# Patient Record
Sex: MALE | Race: WHITE | NOT HISPANIC OR LATINO | Employment: FULL TIME | URBAN - METROPOLITAN AREA
[De-identification: names, ages, dates, MRNs, and addresses within clinical notes are randomized per-mention and may not be internally consistent; named-entity substitution may affect disease eponyms.]

---

## 2017-05-04 ENCOUNTER — TRANSFERRED RECORDS (OUTPATIENT)
Dept: HEALTH INFORMATION MANAGEMENT | Facility: CLINIC | Age: 47
End: 2017-05-04

## 2018-01-16 ENCOUNTER — TRANSFERRED RECORDS (OUTPATIENT)
Dept: HEALTH INFORMATION MANAGEMENT | Facility: CLINIC | Age: 48
End: 2018-01-16

## 2018-09-10 ENCOUNTER — TRANSFERRED RECORDS (OUTPATIENT)
Dept: MULTI SPECIALTY CLINIC | Facility: CLINIC | Age: 48
End: 2018-09-10

## 2019-04-02 ENCOUNTER — TRANSFERRED RECORDS (OUTPATIENT)
Dept: HEALTH INFORMATION MANAGEMENT | Facility: CLINIC | Age: 49
End: 2019-04-02

## 2020-01-27 ENCOUNTER — TRANSFERRED RECORDS (OUTPATIENT)
Dept: HEALTH INFORMATION MANAGEMENT | Facility: CLINIC | Age: 50
End: 2020-01-27

## 2021-06-09 ENCOUNTER — TRANSFERRED RECORDS (OUTPATIENT)
Dept: HEALTH INFORMATION MANAGEMENT | Facility: CLINIC | Age: 51
End: 2021-06-09

## 2021-08-19 ENCOUNTER — TRANSFERRED RECORDS (OUTPATIENT)
Dept: HEALTH INFORMATION MANAGEMENT | Facility: CLINIC | Age: 51
End: 2021-08-19

## 2022-04-15 ENCOUNTER — TRANSFERRED RECORDS (OUTPATIENT)
Dept: HEALTH INFORMATION MANAGEMENT | Facility: CLINIC | Age: 52
End: 2022-04-15

## 2022-04-21 ENCOUNTER — TRANSFERRED RECORDS (OUTPATIENT)
Dept: HEALTH INFORMATION MANAGEMENT | Facility: CLINIC | Age: 52
End: 2022-04-21

## 2024-05-07 ENCOUNTER — TRANSFERRED RECORDS (OUTPATIENT)
Dept: HEALTH INFORMATION MANAGEMENT | Facility: CLINIC | Age: 54
End: 2024-05-07

## 2024-05-14 ENCOUNTER — TRANSFERRED RECORDS (OUTPATIENT)
Dept: HEALTH INFORMATION MANAGEMENT | Facility: CLINIC | Age: 54
End: 2024-05-14

## 2024-06-02 ENCOUNTER — HEALTH MAINTENANCE LETTER (OUTPATIENT)
Age: 54
End: 2024-06-02

## 2024-07-11 ENCOUNTER — OFFICE VISIT (OUTPATIENT)
Dept: FAMILY MEDICINE | Facility: CLINIC | Age: 54
End: 2024-07-11
Payer: COMMERCIAL

## 2024-07-11 VITALS
DIASTOLIC BLOOD PRESSURE: 84 MMHG | RESPIRATION RATE: 18 BRPM | TEMPERATURE: 97.2 F | HEART RATE: 82 BPM | SYSTOLIC BLOOD PRESSURE: 134 MMHG | HEIGHT: 74 IN | WEIGHT: 267.2 LBS | OXYGEN SATURATION: 98 % | BODY MASS INDEX: 34.29 KG/M2

## 2024-07-11 DIAGNOSIS — E78.5 HYPERLIPIDEMIA LDL GOAL <100: ICD-10-CM

## 2024-07-11 DIAGNOSIS — R79.89 LOW TESTOSTERONE IN MALE: ICD-10-CM

## 2024-07-11 DIAGNOSIS — E22.1 HYPERPROLACTINEMIA (H): ICD-10-CM

## 2024-07-11 DIAGNOSIS — I10 HYPERTENSION WITH GOAL BLOOD PRESSURE LESS THAN 120/80: ICD-10-CM

## 2024-07-11 DIAGNOSIS — Z12.5 SCREENING FOR PROSTATE CANCER: ICD-10-CM

## 2024-07-11 DIAGNOSIS — E11.9 TYPE 2 DIABETES MELLITUS WITHOUT COMPLICATION, WITHOUT LONG-TERM CURRENT USE OF INSULIN (H): Primary | ICD-10-CM

## 2024-07-11 PROCEDURE — G2211 COMPLEX E/M VISIT ADD ON: HCPCS | Performed by: INTERNAL MEDICINE

## 2024-07-11 PROCEDURE — 99204 OFFICE O/P NEW MOD 45 MIN: CPT | Performed by: INTERNAL MEDICINE

## 2024-07-11 RX ORDER — DAPAGLIFLOZIN AND METFORMIN HYDROCHLORIDE 10; 1000 MG/1; MG/1
TABLET, FILM COATED, EXTENDED RELEASE ORAL
COMMUNITY
Start: 2024-04-07 | End: 2024-07-30

## 2024-07-11 RX ORDER — PANTOPRAZOLE SODIUM 40 MG/1
FOR SUSPENSION ORAL
COMMUNITY
Start: 2013-07-01

## 2024-07-11 RX ORDER — HALOBETASOL PROPIONATE 0.5 MG/G
CREAM TOPICAL
COMMUNITY
Start: 2013-07-01

## 2024-07-11 RX ORDER — TESTOSTERONE UNDECANOATE 237 MG/1
CAPSULE, LIQUID FILLED ORAL
COMMUNITY
Start: 2023-06-01 | End: 2024-10-06

## 2024-07-11 RX ORDER — TADALAFIL 20 MG/1
TABLET ORAL
COMMUNITY
Start: 2013-07-01

## 2024-07-11 RX ORDER — TIRZEPATIDE 7.5 MG/.5ML
INJECTION, SOLUTION SUBCUTANEOUS
COMMUNITY
Start: 2024-04-07 | End: 2024-07-16

## 2024-07-11 RX ORDER — CHOLECALCIFEROL (VITAMIN D3) 50 MCG
TABLET ORAL
COMMUNITY
Start: 2015-07-01

## 2024-07-11 RX ORDER — PRAZOSIN HYDROCHLORIDE 1 MG/1
1 CAPSULE ORAL AT BEDTIME
Qty: 30 CAPSULE | Refills: 5 | Status: SHIPPED | OUTPATIENT
Start: 2024-07-11 | End: 2024-07-29

## 2024-07-11 RX ORDER — LOSARTAN POTASSIUM 50 MG/1
TABLET ORAL
COMMUNITY
Start: 2013-07-01

## 2024-07-11 RX ORDER — INSULIN DETEMIR 100 [IU]/ML
INJECTION, SOLUTION SUBCUTANEOUS
COMMUNITY
Start: 2023-12-14 | End: 2024-08-05

## 2024-07-11 RX ORDER — FENOFIBRATE 150 MG/1
CAPSULE ORAL
COMMUNITY
Start: 2013-07-01 | End: 2024-08-18

## 2024-07-11 RX ORDER — NIFEDIPINE 30 MG
TABLET, EXTENDED RELEASE ORAL
COMMUNITY
Start: 2018-07-01

## 2024-07-11 RX ORDER — ATORVASTATIN CALCIUM 40 MG/1
TABLET, FILM COATED ORAL
COMMUNITY
Start: 2013-07-01 | End: 2024-09-30

## 2024-07-11 ASSESSMENT — PAIN SCALES - GENERAL: PAINLEVEL: NO PAIN (0)

## 2024-07-11 NOTE — PROGRESS NOTES
[facility] INTERNAL MEDICINE NOTE    Sundeep Fernandez is a 54 year old male who presents to clinic today for the following health issues:    Diabetes Follow-up    How often are you checking your blood sugar? Two times daily  Blood sugar testing frequency justification:  Adjustment of medication(s)  What time of day are you checking your blood sugars (select all that apply)?  Before meals and At bedtime  Have you had any blood sugars above 200?  Yes   Have you had any blood sugars below 70?  No  What symptoms do you notice when your blood sugar is low?  Shaky and Lethargy  What concerns do you have today about your diabetes? None   Do you have any of these symptoms? (Select all that apply)  No numbness or tingling in feet.  No redness, sores or blisters on feet.  No complaints of excessive thirst.  No reports of blurry vision.  No significant changes to weight.    Updates:  -Difficulty sleeping.  -Takes Nifedipine for esophageal dysmotility.  -S/P Nissen fundoplication.        Patient Active Problem List   Diagnosis    Diabetes mellitus, type 2 (H)    Sleep apnea     Past Surgical History:   Procedure Laterality Date    ABDOMEN SURGERY  9/1/99    Reflux surgery    COLONOSCOPY  8/1/22    ENT SURGERY  9/1/2010    Septoplasty surgery    ORTHOPEDIC SURGERY  9/15/22    right rotator cuff surgery       Social History     Tobacco Use    Smoking status: Never     Passive exposure: Never    Smokeless tobacco: Never   Substance Use Topics    Alcohol use: Yes     Comment: maybe three beers a week     Family History   Problem Relation Age of Onset    Diabetes Father         Became diabetic after heart surgery    Hypertension Father     Breast Cancer Sister          Allergies   Allergen Reactions    Ace Inhibitors Hives        BP Readings from Last 3 Encounters:   07/11/24 134/84    Wt Readings from Last 3 Encounters:   07/11/24 121.2 kg (267 lb 3.2 oz)     "       ROS:  C: NEGATIVE for fever, chills, change in weight  I: NEGATIVE for worrisome rashes, moles or lesions  E: NEGATIVE for vision changes or irritation  E/M: NEGATIVE for ear, mouth and throat problems  R: NEGATIVE for significant cough or SOB  B: NEGATIVE for masses, tenderness or discharge  CV: NEGATIVE for chest pain, palpitations or peripheral edema  GI: NEGATIVE for nausea, abdominal pain, heartburn, or change in bowel habits  : NEGATIVE for frequency, dysuria, or hematuria  M: NEGATIVE for significant arthralgias or myalgia  N: NEGATIVE for weakness, dizziness or paresthesias  E: NEGATIVE for temperature intolerance, skin/hair changes  H: NEGATIVE for bleeding problems  P: NEGATIVE for changes in mood or affect    OBJECTIVE:                                                    /84 (BP Location: Left arm, Patient Position: Sitting, Cuff Size: Adult Large)   Pulse 82   Temp 97.2  F (36.2  C) (Temporal)   Resp 18   Ht 1.88 m (6' 2\")   Wt 121.2 kg (267 lb 3.2 oz)   SpO2 98%   BMI 34.31 kg/m    Body mass index is 34.31 kg/m .  GENERAL: alert and no distress  EYES: Eyes grossly normal to inspection and conjunctivae and sclerae normal  HENT: normal cephalic/atraumatic and oral mucous membranes moist  NECK: no adenopathy and thyroid normal to palpation  RESP: lungs clear to auscultation - no rales, rhonchi or wheezes  CV: regular rates and rhythm and no peripheral edema  ABDOMEN: soft, nontender and bowel sounds normal  MS: no gross musculoskeletal defects noted, no edema  NEURO: Normal strength and tone, mentation intact and speech normal  PSYCH: mentation appears normal, affect normal/bright    Diagnostic Test Results:  Recent Labs   Lab Test 07/18/24  0819   A1C 6.4*   LDL 65   HDL 18*   TRIG 218*   ALT 13   CR 1.12   GFRESTIMATED 77   POTASSIUM 4.7        ASSESSMENT/PLAN:                                                      Type 2 diabetes mellitus without complication, without long-term " current use of insulin (H)  - Hemoglobin A1c; Future  - Comprehensive metabolic panel; Future  - Albumin Random Urine Quantitative with Creat Ratio; Future  - MOUNJARO 7.5 MG/0.5ML pen; Inject 7.5 mg subcutaneously once a week    Low testosterone in male  - Adult Urology  Referral; Future    Hypertension with goal blood pressure less than 120/80  - prazosin (MINIPRESS) 1 MG capsule; Take 1 capsule (1 mg) by mouth at bedtime    Hyperlipidemia LDL goal <100  - Lipid panel reflex to direct LDL Fasting; Future    Hyperprolactinemia (H24)  - Prolactin; Future    Screening for prostate cancer  - PSA, screen; Future     Disposition:  Follow-up in 12 weeks or as needed.    Mahesh Mayo MD  [facility]

## 2024-07-16 ENCOUNTER — MYC REFILL (OUTPATIENT)
Dept: FAMILY MEDICINE | Facility: CLINIC | Age: 54
End: 2024-07-16
Payer: COMMERCIAL

## 2024-07-16 RX ORDER — TIRZEPATIDE 7.5 MG/.5ML
INJECTION, SOLUTION SUBCUTANEOUS
Status: CANCELLED | OUTPATIENT
Start: 2024-07-16

## 2024-07-16 RX ORDER — TIRZEPATIDE 7.5 MG/.5ML
7.5 INJECTION, SOLUTION SUBCUTANEOUS WEEKLY
Qty: 2 ML | Refills: 11 | Status: SHIPPED | OUTPATIENT
Start: 2024-07-16

## 2024-07-18 ENCOUNTER — LAB (OUTPATIENT)
Dept: LAB | Facility: CLINIC | Age: 54
End: 2024-07-18
Payer: COMMERCIAL

## 2024-07-18 DIAGNOSIS — E78.5 HYPERLIPIDEMIA LDL GOAL <100: ICD-10-CM

## 2024-07-18 DIAGNOSIS — Z12.5 SCREENING FOR PROSTATE CANCER: ICD-10-CM

## 2024-07-18 DIAGNOSIS — E11.9 TYPE 2 DIABETES MELLITUS WITHOUT COMPLICATION, WITHOUT LONG-TERM CURRENT USE OF INSULIN (H): ICD-10-CM

## 2024-07-18 DIAGNOSIS — E22.1 HYPERPROLACTINEMIA (H): ICD-10-CM

## 2024-07-18 LAB
ALBUMIN SERPL BCG-MCNC: 4.3 G/DL (ref 3.5–5.2)
ALP SERPL-CCNC: 66 U/L (ref 40–150)
ALT SERPL W P-5'-P-CCNC: 13 U/L (ref 0–70)
ANION GAP SERPL CALCULATED.3IONS-SCNC: 9 MMOL/L (ref 7–15)
AST SERPL W P-5'-P-CCNC: 33 U/L (ref 0–45)
BILIRUB SERPL-MCNC: 0.4 MG/DL
BUN SERPL-MCNC: 16.6 MG/DL (ref 6–20)
CALCIUM SERPL-MCNC: 9.3 MG/DL (ref 8.8–10.4)
CHLORIDE SERPL-SCNC: 104 MMOL/L (ref 98–107)
CHOLEST SERPL-MCNC: 127 MG/DL
CREAT SERPL-MCNC: 1.12 MG/DL (ref 0.67–1.17)
CREAT UR-MCNC: 127 MG/DL
EGFRCR SERPLBLD CKD-EPI 2021: 77 ML/MIN/1.73M2
FASTING STATUS PATIENT QL REPORTED: YES
FASTING STATUS PATIENT QL REPORTED: YES
GLUCOSE SERPL-MCNC: 121 MG/DL (ref 70–99)
HBA1C MFR BLD: 6.4 % (ref 0–5.6)
HCO3 SERPL-SCNC: 25 MMOL/L (ref 22–29)
HDLC SERPL-MCNC: 18 MG/DL
LDLC SERPL CALC-MCNC: 65 MG/DL
MICROALBUMIN UR-MCNC: 13 MG/L
MICROALBUMIN/CREAT UR: 10.24 MG/G CR (ref 0–17)
NONHDLC SERPL-MCNC: 109 MG/DL
POTASSIUM SERPL-SCNC: 4.7 MMOL/L (ref 3.4–5.3)
PROLACTIN SERPL 3RD IS-MCNC: 8 NG/ML (ref 4–15)
PROT SERPL-MCNC: 6.9 G/DL (ref 6.4–8.3)
PSA SERPL DL<=0.01 NG/ML-MCNC: 0.93 NG/ML (ref 0–3.5)
SODIUM SERPL-SCNC: 138 MMOL/L (ref 135–145)
TRIGL SERPL-MCNC: 218 MG/DL

## 2024-07-18 PROCEDURE — 82043 UR ALBUMIN QUANTITATIVE: CPT

## 2024-07-18 PROCEDURE — 80061 LIPID PANEL: CPT

## 2024-07-18 PROCEDURE — 80053 COMPREHEN METABOLIC PANEL: CPT

## 2024-07-18 PROCEDURE — G0103 PSA SCREENING: HCPCS

## 2024-07-18 PROCEDURE — 83036 HEMOGLOBIN GLYCOSYLATED A1C: CPT

## 2024-07-18 PROCEDURE — 36415 COLL VENOUS BLD VENIPUNCTURE: CPT

## 2024-07-18 PROCEDURE — 82570 ASSAY OF URINE CREATININE: CPT

## 2024-07-18 PROCEDURE — 84146 ASSAY OF PROLACTIN: CPT

## 2024-07-29 ENCOUNTER — MYC MEDICAL ADVICE (OUTPATIENT)
Dept: FAMILY MEDICINE | Facility: CLINIC | Age: 54
End: 2024-07-29
Payer: COMMERCIAL

## 2024-07-29 DIAGNOSIS — G47.00 INSOMNIA, UNSPECIFIED TYPE: Primary | ICD-10-CM

## 2024-07-29 RX ORDER — TRAZODONE HYDROCHLORIDE 50 MG/1
50 TABLET, FILM COATED ORAL EVERY EVENING
Qty: 30 TABLET | Refills: 5 | Status: SHIPPED | OUTPATIENT
Start: 2024-07-29 | End: 2024-08-20

## 2024-07-30 ENCOUNTER — MYC REFILL (OUTPATIENT)
Dept: FAMILY MEDICINE | Facility: CLINIC | Age: 54
End: 2024-07-30
Payer: COMMERCIAL

## 2024-07-30 DIAGNOSIS — E11.69 TYPE 2 DIABETES MELLITUS WITH OBESITY (H): Primary | ICD-10-CM

## 2024-07-30 DIAGNOSIS — E66.9 TYPE 2 DIABETES MELLITUS WITH OBESITY (H): Primary | ICD-10-CM

## 2024-07-30 RX ORDER — DAPAGLIFLOZIN AND METFORMIN HYDROCHLORIDE 10; 1000 MG/1; MG/1
1 TABLET, FILM COATED, EXTENDED RELEASE ORAL DAILY
Qty: 90 TABLET | Refills: 1 | Status: SHIPPED | OUTPATIENT
Start: 2024-07-30

## 2024-08-05 ENCOUNTER — MYC MEDICAL ADVICE (OUTPATIENT)
Dept: FAMILY MEDICINE | Facility: CLINIC | Age: 54
End: 2024-08-05
Payer: COMMERCIAL

## 2024-08-05 DIAGNOSIS — E11.9 TYPE 2 DIABETES MELLITUS TREATED WITH INSULIN (H): Primary | ICD-10-CM

## 2024-08-05 DIAGNOSIS — Z79.4 TYPE 2 DIABETES MELLITUS TREATED WITH INSULIN (H): Primary | ICD-10-CM

## 2024-08-18 ENCOUNTER — MYC REFILL (OUTPATIENT)
Dept: FAMILY MEDICINE | Facility: CLINIC | Age: 54
End: 2024-08-18
Payer: COMMERCIAL

## 2024-08-18 DIAGNOSIS — G47.00 INSOMNIA, UNSPECIFIED TYPE: ICD-10-CM

## 2024-08-18 DIAGNOSIS — F51.04 CHRONIC INSOMNIA: Primary | ICD-10-CM

## 2024-08-20 RX ORDER — TRAZODONE HYDROCHLORIDE 50 MG/1
50 TABLET, FILM COATED ORAL EVERY EVENING
Qty: 90 TABLET | Refills: 3 | Status: SHIPPED | OUTPATIENT
Start: 2024-08-20

## 2024-08-20 RX ORDER — TRAZODONE HYDROCHLORIDE 50 MG/1
50 TABLET, FILM COATED ORAL EVERY EVENING
Qty: 30 TABLET | Refills: 5 | OUTPATIENT
Start: 2024-08-20

## 2024-08-20 RX ORDER — FENOFIBRATE 150 MG/1
1 CAPSULE ORAL DAILY
Qty: 90 CAPSULE | Refills: 3 | Status: SHIPPED | OUTPATIENT
Start: 2024-08-20

## 2024-08-21 DIAGNOSIS — E78.1 HYPERTRIGLYCERIDEMIA: Primary | ICD-10-CM

## 2024-08-21 RX ORDER — FENOFIBRATE 160 MG/1
160 TABLET ORAL DAILY
Qty: 90 TABLET | Refills: 3 | Status: SHIPPED | OUTPATIENT
Start: 2024-08-21

## 2024-08-21 NOTE — TELEPHONE ENCOUNTER
160 mg prescription pended, routing to provider to review/advise      Alfreda Don, RN, BSN  [facility]  
Pharmacy is requesting change to 160 mg.    Fenofibrate 150 MG CAPS 90 capsule 3 8/20/2024      Is not available to order.  
Self Administrated

## 2024-09-16 ENCOUNTER — MYC MEDICAL ADVICE (OUTPATIENT)
Dept: FAMILY MEDICINE | Facility: CLINIC | Age: 54
End: 2024-09-16
Payer: COMMERCIAL

## 2024-09-16 DIAGNOSIS — Z79.4 TYPE 2 DIABETES MELLITUS TREATED WITH INSULIN (H): Primary | ICD-10-CM

## 2024-09-16 DIAGNOSIS — E11.9 TYPE 2 DIABETES MELLITUS TREATED WITH INSULIN (H): Primary | ICD-10-CM

## 2024-09-16 NOTE — TELEPHONE ENCOUNTER
Patient requesting new prescription for insulin pen needles.     Routing to provider to review/advise       Jessie Mirza RN  [facility]

## 2024-09-17 ENCOUNTER — LAB (OUTPATIENT)
Dept: LAB | Facility: CLINIC | Age: 54
End: 2024-09-17
Payer: COMMERCIAL

## 2024-09-17 DIAGNOSIS — N52.1 ERECTILE DISORDER DUE TO MEDICAL CONDITION IN MALE PATIENT: ICD-10-CM

## 2024-09-17 DIAGNOSIS — R97.20 ELEVATED PROSTATE SPECIFIC ANTIGEN (PSA): ICD-10-CM

## 2024-09-17 DIAGNOSIS — E29.1 3-OXO-5 ALPHA-STEROID DELTA 4-DEHYDROGENASE DEFICIENCY: ICD-10-CM

## 2024-09-17 LAB
HCT VFR BLD AUTO: 50.5 % (ref 40–53)
HGB BLD-MCNC: 17 G/DL (ref 13.3–17.7)
PSA FREE MFR SERPL: 26.32 %
PSA FREE SERPL-MCNC: 0.3 NG/ML
PSA SERPL DL<=0.01 NG/ML-MCNC: 1.14 NG/ML (ref 0–3.5)
SHBG SERPL-SCNC: 13 NMOL/L (ref 11–80)

## 2024-09-17 PROCEDURE — 84270 ASSAY OF SEX HORMONE GLOBUL: CPT

## 2024-09-17 PROCEDURE — 36415 COLL VENOUS BLD VENIPUNCTURE: CPT

## 2024-09-17 PROCEDURE — 85018 HEMOGLOBIN: CPT

## 2024-09-17 PROCEDURE — 85014 HEMATOCRIT: CPT

## 2024-09-17 PROCEDURE — 84153 ASSAY OF PSA TOTAL: CPT

## 2024-09-17 PROCEDURE — 84403 ASSAY OF TOTAL TESTOSTERONE: CPT

## 2024-09-17 PROCEDURE — 84154 ASSAY OF PSA FREE: CPT

## 2024-09-20 LAB
TESTOST FREE SERPL-MCNC: 3.78 NG/DL
TESTOST SERPL-MCNC: 132 NG/DL (ref 240–950)

## 2024-09-23 ENCOUNTER — MYC MEDICAL ADVICE (OUTPATIENT)
Dept: FAMILY MEDICINE | Facility: CLINIC | Age: 54
End: 2024-09-23
Payer: COMMERCIAL

## 2024-09-23 DIAGNOSIS — E29.1 MALE HYPOGONADISM: Primary | ICD-10-CM

## 2024-09-30 ENCOUNTER — MYC REFILL (OUTPATIENT)
Dept: FAMILY MEDICINE | Facility: CLINIC | Age: 54
End: 2024-09-30
Payer: COMMERCIAL

## 2024-09-30 DIAGNOSIS — E78.5 HYPERLIPIDEMIA LDL GOAL <70: Primary | ICD-10-CM

## 2024-10-01 RX ORDER — ATORVASTATIN CALCIUM 40 MG/1
40 TABLET, FILM COATED ORAL DAILY
Qty: 90 TABLET | Refills: 2 | Status: SHIPPED | OUTPATIENT
Start: 2024-10-01

## 2024-10-02 ENCOUNTER — IMMUNIZATION (OUTPATIENT)
Dept: FAMILY MEDICINE | Facility: CLINIC | Age: 54
End: 2024-10-02
Payer: COMMERCIAL

## 2024-10-02 PROCEDURE — 91320 SARSCV2 VAC 30MCG TRS-SUC IM: CPT

## 2024-10-02 PROCEDURE — 90480 ADMN SARSCOV2 VAC 1/ONLY CMP: CPT

## 2024-10-02 PROCEDURE — 90471 IMMUNIZATION ADMIN: CPT

## 2024-10-02 PROCEDURE — 90673 RIV3 VACCINE NO PRESERV IM: CPT

## 2024-10-06 ENCOUNTER — MYC REFILL (OUTPATIENT)
Dept: FAMILY MEDICINE | Facility: CLINIC | Age: 54
End: 2024-10-06
Payer: COMMERCIAL

## 2024-10-06 DIAGNOSIS — E29.1 MALE HYPOGONADISM: Primary | ICD-10-CM

## 2024-10-07 RX ORDER — TESTOSTERONE UNDECANOATE 237 MG/1
237 CAPSULE, LIQUID FILLED ORAL 2 TIMES DAILY
Qty: 180 CAPSULE | Refills: 1 | Status: SHIPPED | OUTPATIENT
Start: 2024-10-07

## 2024-10-07 RX ORDER — TESTOSTERONE UNDECANOATE 237 MG/1
237 CAPSULE, LIQUID FILLED ORAL 2 TIMES DAILY
Qty: 60 CAPSULE | Refills: 5 | Status: SHIPPED | OUTPATIENT
Start: 2024-10-07 | End: 2024-10-07

## 2024-10-07 NOTE — TELEPHONE ENCOUNTER
Clinic RN: Please contact patient because the medication is listed as historical or discontinued. Confirm patient is taking this medication. Document findings and route refill encounter to provider for approval or denial.      ALAN AlbaN, RN  [facility]

## 2024-10-07 NOTE — TELEPHONE ENCOUNTER
Called patient regarding refill request for Jatenzo. Per patient he is taking this as prescribed. In the past this was prescribed by his urologist in [place]. Now that he moved back to [place], he needs a refill until he sees the endocrinologist in November.     Routing to review/advise.     Jessie SPARKS,  RN  [facility]

## 2024-10-12 ENCOUNTER — TRANSFERRED RECORDS (OUTPATIENT)
Dept: MULTI SPECIALTY CLINIC | Facility: CLINIC | Age: 54
End: 2024-10-12
Payer: COMMERCIAL

## 2024-10-12 LAB — RETINOPATHY: NORMAL

## 2024-10-14 ENCOUNTER — MYC REFILL (OUTPATIENT)
Dept: FAMILY MEDICINE | Facility: CLINIC | Age: 54
End: 2024-10-14
Payer: COMMERCIAL

## 2024-10-14 DIAGNOSIS — E11.9 TYPE 2 DIABETES MELLITUS TREATED WITH INSULIN (H): ICD-10-CM

## 2024-10-14 DIAGNOSIS — Z79.4 TYPE 2 DIABETES MELLITUS TREATED WITH INSULIN (H): ICD-10-CM

## 2024-10-14 NOTE — TELEPHONE ENCOUNTER
Pharmacy requested refills that are already active on file. Refused request to pharmacy.  
Pt calling regarding refill request for insulin glargine (LANTUS PEN) 100 UNIT/ML pen. Per refill RN note, refills on file at pharmacy. RN advised pt to call pharmacy to request refill, pt verbalized understanding.     Sheila Sawyer RN  
no

## 2024-10-15 ENCOUNTER — LAB (OUTPATIENT)
Dept: LAB | Facility: CLINIC | Age: 54
End: 2024-10-15
Payer: COMMERCIAL

## 2024-10-15 DIAGNOSIS — E29.1 MALE HYPOGONADISM: ICD-10-CM

## 2024-10-15 LAB — SHBG SERPL-SCNC: 13 NMOL/L (ref 11–80)

## 2024-10-15 PROCEDURE — 84270 ASSAY OF SEX HORMONE GLOBUL: CPT

## 2024-10-15 PROCEDURE — 36415 COLL VENOUS BLD VENIPUNCTURE: CPT

## 2024-10-15 PROCEDURE — 84403 ASSAY OF TOTAL TESTOSTERONE: CPT

## 2024-10-17 LAB
TESTOST FREE SERPL-MCNC: 7.4 NG/DL
TESTOST SERPL-MCNC: 250 NG/DL (ref 240–950)

## 2024-10-20 ENCOUNTER — HEALTH MAINTENANCE LETTER (OUTPATIENT)
Age: 54
End: 2024-10-20

## 2024-10-21 ENCOUNTER — VIRTUAL VISIT (OUTPATIENT)
Dept: FAMILY MEDICINE | Facility: CLINIC | Age: 54
End: 2024-10-21
Payer: COMMERCIAL

## 2024-10-21 DIAGNOSIS — J32.9 SINUSITIS, UNSPECIFIED CHRONICITY, UNSPECIFIED LOCATION: Primary | ICD-10-CM

## 2024-10-21 PROCEDURE — 99213 OFFICE O/P EST LOW 20 MIN: CPT | Mod: 95 | Performed by: PHYSICIAN ASSISTANT

## 2024-10-21 RX ORDER — FLUTICASONE PROPIONATE 50 MCG
1 SPRAY, SUSPENSION (ML) NASAL DAILY
Qty: 16 G | Refills: 1 | Status: SHIPPED | OUTPATIENT
Start: 2024-10-21

## 2024-10-21 NOTE — PROGRESS NOTES
"Chente is a 54 year old who is being evaluated via a billable video visit.    How would you like to obtain your AVS? MyChart  If the video visit is dropped, the invitation should be resent by: Text to cell phone: [phone]  Will anyone else be joining your video visit? No      Assessment & Plan     Sinusitis, unspecified chronicity, unspecified location  Encouraged pt to take covid test and if negative try nasal spray for a few days and if still not improving start antibiotics .  If covid positive would continue symptomatic treatment for longer.  Follow up as needed  - fluticasone (FLONASE) 50 MCG/ACT nasal spray; Spray 1 spray into both nostrils daily.  - amoxicillin-clavulanate (AUGMENTIN) 875-125 MG tablet; Take 1 tablet by mouth 2 times daily.          BMI  Estimated body mass index is 34.31 kg/m  as calculated from the following:    Height as of 7/11/24: 1.88 m (6' 2\").    Weight as of 7/11/24: 121.2 kg (267 lb 3.2 oz).   Weight management plan: not addressed           Subjective   Chente is a 54 year old, presenting for the following health issues:  URI      10/21/2024    10:28 AM   Additional Questions   Roomed by Angie   Accompanied by self     History of Present Illness       Reason for visit:  Cold   He is taking medications regularly.       Acute Illness  Acute illness concerns:   Onset/Duration: 1 week -day 6   Symptoms:  Fever: No  Chills/Sweats: YES-sweats at night  Headache (location?): YES  Sinus Pressure: YES  Conjunctivitis:  No  Ear Pain: no  Rhinorrhea: No  Congestion: No  Sore Throat: No  Cough: no  Wheeze: No  Decreased Appetite: YES  Nausea: No  Vomiting: No  Diarrhea: YES more then a week ago -this is how it started over a week ago.  This has resolved.    Dysuria/Freq.: No  Dysuria or Hematuria: No  Fatigue/Achiness: YES  Sick/Strep Exposure: No  2 weekends ago did travel.    Didn't test for covid.    Therapies tried and outcome: Nicole-Trisha-did not help   Not improving.  More tired today.  " Didn't sleep well last night.  Has had 2 lows yesterday due to lack of appetite.  Fatigue is bad.    Hx of sinus surgery that wasn't successful.  Always feels congestion.  Hasn't tried anything for the congestion.                Objective           Vitals:  No vitals were obtained today due to virtual visit.    Physical Exam   GENERAL: alert and no distress  EYES: Eyes grossly normal to inspection.  No discharge or erythema, or obvious scleral/conjunctival abnormalities.  RESP: No audible wheeze, cough, or visible cyanosis.    SKIN: Visible skin clear. No significant rash, abnormal pigmentation or lesions.  NEURO: Cranial nerves grossly intact.  Mentation and speech appropriate for age.  PSYCH: Appropriate affect, tone, and pace of words          Video-Visit Details    Type of service:  Video Visit   Originating Location (pt. Location): Home    Distant Location (provider location):  On-site  Platform used for Video Visit: Joyce  Signed Electronically by: Rica Putnam PA-C

## 2024-10-21 NOTE — CONFIDENTIAL NOTE
RECORDS RECEIVED FROM: internal    DATE RECEIVED: 11.5.24    NOTES (FOR ALL VISITS) STATUS DETAILS   OFFICE NOTES from referring provider internal     OFFICE NOTES from other specialist Received  [id number], 5.7.24 Rocio      MEDICATION LIST internal     LABS     DIABETES: HBGA1C, CREATININE, FASTING LIPIDS, MICROALBUMIN URINE, POTASSIUM, TSH, T4    THYROID: TSH, T4, CBC, THYRODLONULIN, TOTAL T3, FREE T4, CALCITONIN, CEA internal  Testosterone free- 10.15.24   Prolactin- 7/18/24  CMP-  7/18/24  Lipid-  7/18/24  HBGA1C-  7/18/24  Received labs- 5/7/24

## 2024-10-23 ENCOUNTER — TELEPHONE (OUTPATIENT)
Dept: FAMILY MEDICINE | Facility: CLINIC | Age: 54
End: 2024-10-23
Payer: COMMERCIAL

## 2024-10-23 DIAGNOSIS — R79.89 LOW TESTOSTERONE IN MALE: Primary | ICD-10-CM

## 2024-10-23 NOTE — TELEPHONE ENCOUNTER
Order/Referral Request    Who is requesting: Pt.    Orders being requested: referral for endocrinology    Reason service is needed/diagnosis: referral for endocrinology    When are orders needed by: asap    Has this been discussed with Provider: Yes PCP aware    Does patient have a preference on a Group/Provider/Facility? In network provider (NOT Dr. Sawant)    Does patient have an appointment scheduled?: No    Where to send orders: N/A and N/A    Could we send this information to you in Mobly or would you prefer to receive a phone call?:   Patient would prefer a phone call or Phylogyt  Okay to leave a detailed message?: Yes at Cell number on file:    Telephone Information:   Mobile [phone]

## 2024-10-23 NOTE — TELEPHONE ENCOUNTER
This writer attempted to contact pt on 10/23/24      Reason for call to relay referral information  and left message.      If patient calls back:   Relay msg down below.         Althea Chavez RN

## 2024-10-23 NOTE — TELEPHONE ENCOUNTER
Pt had an appointment on 11/5 and is now cancelled.   States that Dr. Sawant is not in network.     The referral that was placed on 7/11/24 is for urology but they do not manage testosterone concerns.       Pt states that he needs a referral specifically for endocrinology.    Rae Cordero, ALANN, RN  [facility]

## 2024-10-23 NOTE — TELEPHONE ENCOUNTER
Referral Details    Referred By  Referred To   Mahesh Mayo MD [address] [place] [address]   Phone: [phone]   Fax: [phone]    Diagnoses: Low testosterone in male   Order: Adult Endocrinology  Referral       Comment: Please be aware that coverage of these services is subject to the terms and limitations of your health insurance plan.  Call member services at your health plan with any benefit or coverage questions.   [email or web address] will call you to coordinate your care as prescribed by the provider.  If you don t hear from a representative within 2 business days, please call [phone].     Patient Name  Sundeep Fernandez MRN  [id number] Legal Sex  Male   1970     Patient Demographics    Address  [address] [place] [address] [address] [place] [address] Phone  [phone] (Home)  [phone] (Mobile) E-mail Address  [email or web address]

## 2024-10-23 NOTE — TELEPHONE ENCOUNTER
Patient notified of provider message as written.  Patient verbalized understanding. Patient going to contact his insurance company to see what is in network and what isn't.      Kristina Kjellberg, MSN, RN

## 2024-10-28 ENCOUNTER — OFFICE VISIT (OUTPATIENT)
Dept: FAMILY MEDICINE | Facility: CLINIC | Age: 54
End: 2024-10-28
Attending: PHYSICIAN ASSISTANT
Payer: COMMERCIAL

## 2024-10-28 VITALS
OXYGEN SATURATION: 97 % | WEIGHT: 256.5 LBS | SYSTOLIC BLOOD PRESSURE: 100 MMHG | HEART RATE: 93 BPM | HEIGHT: 74 IN | BODY MASS INDEX: 32.92 KG/M2 | TEMPERATURE: 98.1 F | DIASTOLIC BLOOD PRESSURE: 80 MMHG | RESPIRATION RATE: 20 BRPM

## 2024-10-28 DIAGNOSIS — E66.9 TYPE 2 DIABETES MELLITUS WITH OBESITY (H): ICD-10-CM

## 2024-10-28 DIAGNOSIS — E11.69 TYPE 2 DIABETES MELLITUS WITH OBESITY (H): ICD-10-CM

## 2024-10-28 DIAGNOSIS — I10 HYPERTENSION WITH GOAL BLOOD PRESSURE LESS THAN 120/80: ICD-10-CM

## 2024-10-28 DIAGNOSIS — J01.90 ACUTE SINUSITIS WITH SYMPTOMS GREATER THAN 10 DAYS: Primary | ICD-10-CM

## 2024-10-28 PROBLEM — D35.2 PITUITARY ADENOMA (H): Status: ACTIVE | Noted: 2019-06-24

## 2024-10-28 PROBLEM — E78.5 DYSLIPIDEMIA: Status: ACTIVE | Noted: 2017-11-16

## 2024-10-28 PROCEDURE — 99214 OFFICE O/P EST MOD 30 MIN: CPT

## 2024-10-28 RX ORDER — DOXYCYCLINE 100 MG/1
100 CAPSULE ORAL 2 TIMES DAILY
Qty: 14 CAPSULE | Refills: 0 | Status: SHIPPED | OUTPATIENT
Start: 2024-10-28 | End: 2024-11-04

## 2024-10-28 ASSESSMENT — ENCOUNTER SYMPTOMS: FEVER: 1

## 2024-10-28 ASSESSMENT — PAIN SCALES - GENERAL: PAINLEVEL_OUTOF10: NO PAIN (0)

## 2024-10-28 NOTE — PROGRESS NOTES
Assessment & Plan     Acute sinusitis with symptoms greater than 10 days  Patient is a 54 year-old male with hypertension, type 2 diabetes, hyperlipidemia, and history of pituitary adenoma presenting with concerns of ongoing sinusitis symptoms for the past 2 weeks with associated fevers. Had been prescribed Augmentin at time of virtual visit on 10/25/24. Has been taking as prescribed but continues to be symptomatic with fevers. Plan to change Augmentin to 7 day course of BID doxycycline. If symptoms fail to improve despite alternative antibiotic, would recommend serologic testing (CBC with diff, ESR, CRP, BMP, etc) and respiratory PCR for further evaluation of symptoms. Discussed proper use of medication(s) and potential side effects. Follow-up if symptoms fail to improve despite above interventions. Patient understands and is agreeable to plan as discussed in clinic.  - doxycycline hyclate (VIBRAMYCIN) 100 MG capsule; Take 1 capsule (100 mg) by mouth 2 times daily for 7 days.    Type 2 diabetes mellitus with obesity (H)  Stable, managed by endocrinology.     Hypertension with goal blood pressure less than 120/80  Stable during encounter. Continue with prescribed medication regimen.       Subjective   Chente is a 54 year old, presenting for the following health issues:  Follow Up, Congestion, and Fever        10/28/2024     3:13 PM   Additional Questions   Roomed by AD   Accompanied by Spouse       Via the Health Maintenance questionnaire, the patient has reported the following services have been completed -Eye Exam: [facility] [place] [facility] 2024-10-12, this information has been sent to the abstraction team.  History of Present Illness       Reason for visit:  Cold   He is taking medications regularly.     Acute Illness  Acute illness concerns:   Onset/Duration: 2 Weeks ago he started to have diarrhea for 3 days and then Head congestion, stuffy nose and fatigue  Symptoms:  Fever: YES- Fevers started a week ago Today.  "Highest 103 and then will have a 101 fever, on and off.   Chills/Sweats: YES- Hot flash feelings  Headache (location?): No  Sinus Pressure: YES - Intermittent foggy  Conjunctivitis:  No  Ear Pain: no  Rhinorrhea: No  Congestion: No  Sore Throat: No  Cough: no  Wheeze: No  Decreased Appetite: YES  Nausea: No  Vomiting: No  Diarrhea: YES  Dysuria/Freq.: No  Dysuria or Hematuria: No  Fatigue/Achiness: YES  Sick/Strep Exposure: No  Therapies tried and outcome: Was prescribed nasal spray and amoxicillin last week. Neither have done anything. Rotating acetaminophen and tylenol and then ibuprofen at bed time. Also tried sudafed which did nothing    Presents with concerns of ongoing frontal sinus pressure and intermittent fevers for the past 2 weeks. Was seen on 10/25 and prescribed Augmentin for suspect sinus infection. Has been taking as prescribed along with topical nasal corticosteroids without symptoms improvement. Continues to have low grade to moderate fevers that are responding to acetaminophen and ibuprofen. Concerned due to ongoing fatigue and tiredness associated with symptoms. Also noticing a decrease appetite. Blood sugars remain stable.     Denies post-nasal drip, rhinorrhea, nasal discharge, otalgia, cough, sore throat, or other URI symptoms.       Objective    /80 (BP Location: Left arm, Patient Position: Sitting, Cuff Size: Adult Regular)   Pulse 93   Temp 98.1  F (36.7  C) (Temporal)   Resp 20   Ht 1.88 m (6' 2.02\")   Wt 116.3 kg (256 lb 8 oz)   SpO2 97%   BMI 32.92 kg/m    Body mass index is 32.92 kg/m .  Physical Exam  Vitals reviewed.   Constitutional:       General: He is not in acute distress.     Appearance: Normal appearance. He is not ill-appearing.   HENT:      Head: Normocephalic and atraumatic.      Right Ear: Tympanic membrane, ear canal and external ear normal.      Left Ear: Tympanic membrane, ear canal and external ear normal.      Ears:      Comments: Negative for middle ear " effusion, TM injection, bulging, and erythema bilaterally.     Nose: Congestion present. No rhinorrhea.      Right Sinus: No maxillary sinus tenderness or frontal sinus tenderness.      Left Sinus: No maxillary sinus tenderness or frontal sinus tenderness.      Mouth/Throat:      Mouth: Mucous membranes are moist.      Pharynx: Oropharynx is clear. No oropharyngeal exudate or posterior oropharyngeal erythema.   Eyes:      Conjunctiva/sclera: Conjunctivae normal.      Right eye: Right conjunctiva is not injected. No exudate.     Left eye: Left conjunctiva is not injected. No exudate.     Pupils: Pupils are equal, round, and reactive to light.   Cardiovascular:      Rate and Rhythm: Normal rate and regular rhythm.      Heart sounds: Normal heart sounds, S1 normal and S2 normal. No murmur heard.  Pulmonary:      Effort: Pulmonary effort is normal. No respiratory distress.      Breath sounds: Normal breath sounds. No wheezing.   Lymphadenopathy:      Cervical: No cervical adenopathy.   Skin:     General: Skin is warm and dry.      Capillary Refill: Capillary refill takes less than 2 seconds.      Findings: No lesion or rash.   Neurological:      Mental Status: He is alert.   Psychiatric:         Attention and Perception: Attention and perception normal.         Mood and Affect: Mood and affect normal.              Signed Electronically by: EFREN Granda CNP

## 2024-10-28 NOTE — PATIENT INSTRUCTIONS
Symptoms should start to improve over the next 24-48 hours once on the antibiotic. If symptoms continue despite change in medication please follow-up later this week as we should complete labs and respiratory testing for further evaluation of ongoing symptoms.     Symptom Treatment:  - Rest and Hydration: Make sure to get plenty of rest to help your body fight off the infection.  Drink lots of fluids like water, herbal tea, and clear broth to stay hydrated.    - Pain Relief: Over-the-counter pain relievers such as acetaminophen (Tylenol) or ibuprofen (Advil, Motrin) can help alleviate headache or facial pain associated with sinusitis.  You can take 1000 mg of tylenol up to three times daily, as long as another provider has not restricted you from taking this type of medication.  You can take 400-600 mg of Ibuprofen up to three times daily with food, as long as another provider has restricted you from taking this type of medication.    - Warm Compresses: Apply warm compresses to your face to help ease sinus pain and pressure.  Simply soak a clean towel in warm water, wring out the excess water, and place it over your face for a few minutes at a time.    - Nasal Decongestants: Over-the-counter nasal decongestant sprays or drops may help relieve nasal congestion.  Be sure to use them only as directed and for no longer than 3 days to avoid rebound congestion.            Infection Treatment:  - Nasal Saline Irrigation: Use a saline nasal rinse, or nasal spray to help clear out mucus and soothe your nasal passages.  Follow the instructions provided with the saline rinse or spray for proper use. Make sure to use distilled water from the store, not tap water.            - Nasal Corticosteroids: I recommend using a nasal corticosteroid spray, such as Flonase or Nasacort, to reduce inflammation in your nasal passages and improve breathing and drainage.  Use the nasal spray once daily (1-2 sprays into each nostril).   It is  "best to do a saline rinse just prior to using the nasal spray.  Shake and \"prime\" the bottle first making sure a nice spray comes out prior to use.  Aim back into the sinuses, not just straight up your nose. Also aim a little away from the center (septum) of your nose.   Breath in slightly (but not excessively) with each spray. When completed breath out through the mouth  Repeat on the other side.  Wipe of the nozzle with a clean tissue when complete and cover with the manufactures cap.    Store in a room temperature area out of the light.  Don't share sprays with friends and family.    A side effect of most nasal sprays includes nose bleeds.  Be patient as it may take a few days to start working.              - Prescription Medications: After 7-10 days, we typically consider your symptoms to be from a bacterial infection rather than a virus. In your case, we have decided to treat this infection with an antibiotic. Be sure to take the antibiotics exactly as prescribed, even if you start feeling better before you finish the entire course.  All antibiotics can cause diarrhea as a side effect.   In rare instances, use of antibiotics can disrupt your normal gut bacteria that help you digest food and a new bad infection can take over called C. Diff. This leads to explosive watery diarrhea, is not pleasant, and can be difficult to treat. This is one reason we try to avoid antibiotics when able.  Additionally, these bugs can become resistant to our antibiotics over time if used frequently, which is another reason to avoid their use if we can avoid it.    The particular antibiotic I have prescribed for you is called doxycycline. Please take with food to prevent upset stomach and take until course is complete. I would recommend eating yogurt/cottage cheese or taking a probiotic supplement while taking to prevent diarrhea.         When to Seek Medical Attention: It's important to follow up with a healthcare provider if your " symptoms worsen, or do not improve after a few days of treatment.  I may need to reassess your condition and adjust your treatment plan accordingly.  If you develop severe headache, facial swelling, or vision changes.  If you have a high fever lasting more than a few days.  If you experience difficulty breathing or chest pain.  If you have persistent symptoms despite treatment.    Remember, everyone's body responds differently to treatment, so it's important to be patient and give your body time to heal. If you have any questions or concerns, don't hesitate to reach out to me.

## 2024-11-05 ENCOUNTER — PRE VISIT (OUTPATIENT)
Dept: ENDOCRINOLOGY | Facility: CLINIC | Age: 54
End: 2024-11-05

## 2024-11-11 ENCOUNTER — TELEPHONE (OUTPATIENT)
Dept: FAMILY MEDICINE | Facility: OTHER | Age: 54
End: 2024-11-11
Payer: COMMERCIAL

## 2024-11-11 NOTE — TELEPHONE ENCOUNTER
Spoke with patient, scheduled for appointment.    Future Appointments 11/11/2024 - 5/10/2025        Date Visit Type Length Department Provider     11/18/2024 11:30 AM OFFICE VISIT 30 min [facility] Demetria Krishnan APRN CNP    Location Instructions:     [facility] is located at [address] [address], one mile north of the [place] [address] exit off of [address] [place] [address]. From [address] [facility], exit to turn west on [address] [place] [address], then turn south on [address].               4/14/2025 11:00 AM NEW ENDOCRINE 60 min [facility] Cely Hernandez PA-C Imani Soward-Robinson, MA

## 2024-11-18 ENCOUNTER — MYC REFILL (OUTPATIENT)
Dept: FAMILY MEDICINE | Facility: CLINIC | Age: 54
End: 2024-11-18

## 2024-11-18 ENCOUNTER — OFFICE VISIT (OUTPATIENT)
Dept: FAMILY MEDICINE | Facility: CLINIC | Age: 54
End: 2024-11-18
Payer: COMMERCIAL

## 2024-11-18 VITALS
RESPIRATION RATE: 15 BRPM | HEART RATE: 85 BPM | DIASTOLIC BLOOD PRESSURE: 70 MMHG | SYSTOLIC BLOOD PRESSURE: 118 MMHG | BODY MASS INDEX: 32.66 KG/M2 | WEIGHT: 254.5 LBS | OXYGEN SATURATION: 94 % | HEIGHT: 74 IN | TEMPERATURE: 98.1 F

## 2024-11-18 DIAGNOSIS — L30.1 ECZEMA, DYSHIDROTIC: ICD-10-CM

## 2024-11-18 DIAGNOSIS — J32.9 CHRONIC SINUSITIS, UNSPECIFIED LOCATION: Primary | ICD-10-CM

## 2024-11-18 DIAGNOSIS — I10 HTN, GOAL BELOW 140/90: Primary | ICD-10-CM

## 2024-11-18 DIAGNOSIS — E66.9 TYPE 2 DIABETES MELLITUS WITH OBESITY (H): ICD-10-CM

## 2024-11-18 DIAGNOSIS — E11.69 TYPE 2 DIABETES MELLITUS WITH OBESITY (H): ICD-10-CM

## 2024-11-18 DIAGNOSIS — I10 HYPERTENSION WITH GOAL BLOOD PRESSURE LESS THAN 120/80: ICD-10-CM

## 2024-11-18 PROCEDURE — 99214 OFFICE O/P EST MOD 30 MIN: CPT

## 2024-11-18 RX ORDER — PREDNISONE 20 MG/1
40 TABLET ORAL DAILY
Qty: 10 TABLET | Refills: 0 | Status: SHIPPED | OUTPATIENT
Start: 2024-11-18 | End: 2024-11-23

## 2024-11-18 RX ORDER — CLOBETASOL PROPIONATE 0.5 MG/G
CREAM TOPICAL 2 TIMES DAILY
Qty: 30 G | Refills: 0 | Status: SHIPPED | OUTPATIENT
Start: 2024-11-18 | End: 2024-11-25

## 2024-11-18 ASSESSMENT — PAIN SCALES - GENERAL: PAINLEVEL_OUTOF10: NO PAIN (0)

## 2024-11-18 NOTE — PATIENT INSTRUCTIONS
The doxycycline that I prescribed previously for the sinus infection would sufficiently cover the bacteria that causes sinus infections. As you are still having sinus symptoms, I think we should get a CT scan of your sinuses to determine if there is ongoing inflammation or fluid in your sinuses. Insurance will review request for CT imaging and approve usually in 7 days. Please call [phone] to schedule your imaging study.     I have ordered a 5 day course of 40 mg prednisone to further help reduce the inflammation in your sinuses. Take in the morning with food to prevent upset stomach. I would like you to follow-up with ENT. I have placed a referral to ENT through [facility] but I would also look and see if [facility] in [place] is covered by you insurance.     The rash on you middle finger looks like a form of eczema. I have prescribed a high potency steroid cream. Apply twice daily for the next 7 days then as needed thereafter. Avoid using for more than 7 consecutive days due to risk for skin thinning.

## 2024-11-18 NOTE — PROGRESS NOTES
Assessment & Plan     Chronic sinusitis, unspecified location  Patient is a 54 year-old male with hypertension, type 2 diabetes, hyperlipidemia, and history of pituitary adenoma presenting with concerns of continued sinus symptoms and feeling of mental cloudiness/fogginess despite completing 7 day course of BID doxycycline. Advised that 7 day course of BID doxycyline should be sufficient in treating ARBS. Low suspicion for continued bacterial process as fever and other associated symptoms have resolved. Prescribed a 5 day course of 40 mg prednisone to further reduce inflammation occurring. Given ongoing symptoms despite antibiotic treatment, plan to obtain CT of sinuses to evaluate for fluid retention or mucosal thickening. Referral placed to ENT for further evaluation. Discussed proper use of medication(s) and potential side effects. Follow-up if symptoms fail to improve despite above interventions. Patient understands and is agreeable to plan as discussed in clinic.  - CT Sinus w/o Contrast; Future  - predniSONE (DELTASONE) 20 MG tablet; Take 2 tablets (40 mg) by mouth daily for 5 days.  - Adult ENT  Referral; Future    Type 2 diabetes mellitus with obesity (H)  Well controlled with both oral agents and GLP-1. Educated that blood sugar will increase while on prednisone.     Hypertension with goal blood pressure less than 120/80  Stable. Continue with medication regimen as prescribed.     Eczema, dyshidrotic  Prescribed clobetasol, advised to use BID for 7 days the as needed thereafter.   - clobetasol propionate (TEMOVATE) 0.05 % external cream; Apply topically 2 times daily for 7 days.          Subjective   Chente is a 54 year old, presenting for the following health issues:  Sinus Problem        11/18/2024    11:09 AM   Additional Questions   Roomed by Alisia RUSSO   Accompanied by Self     History of Present Illness       Headaches:   Since the patient's last clinic visit, headaches are: no change  The patient  "is getting headaches:  Continued sinus pressure fir the past month.  He is able to do normal daily activities when he has a migraine.  The patient is taking the following rescue/relief medications:  Ibuprofen (Advil, Motrin), Naproxyn (Aleve) and Tylenol   Patient states \"I get some relief\" from the rescue/relief medications.   The patient is taking the following medications to prevent migraines:  No medications to prevent migraines  In the past 4 weeks, the patient has gone to an Urgent Care or Emergency Room 0 times times due to headaches.    Reason for visit:  Continued sinus issues and energy level.    He eats 2-3 servings of fruits and vegetables daily.He consumes 1 sweetened beverage(s) daily.He exercises with enough effort to increase his heart rate 10 to 19 minutes per day.  He exercises with enough effort to increase his heart rate 3 or less days per week.   He is taking medications regularly.     Presents with concerns of ongoing sinus pressure and feeling of cloudiness/fogginess. Completed 7 day course of doxycycline that was prescribed on 10/28/24 for ARBS. [place] that symptoms and energy level improved while on the doxycycline. Denies associated URI symptoms, fever, or chills.     Had previously seen ENT in the past. Had a septoplasty completed in 2010 which was unsuccessful.         Objective    /70   Pulse 85   Temp 98.1  F (36.7  C) (Temporal)   Resp 15   Ht 1.885 m (6' 2.21\")   Wt 115.4 kg (254 lb 8 oz)   SpO2 94%   BMI 32.49 kg/m    Body mass index is 32.49 kg/m .  Physical Exam  Vitals reviewed.   Constitutional:       General: He is not in acute distress.     Appearance: Normal appearance. He is not ill-appearing.   HENT:      Head: Normocephalic and atraumatic.   Eyes:      Conjunctiva/sclera: Conjunctivae normal.      Right eye: Right conjunctiva is not injected. No exudate.     Left eye: Left conjunctiva is not injected. No exudate.     Pupils: Pupils are equal, round, and reactive " to light.   Pulmonary:      Effort: Pulmonary effort is normal. No respiratory distress.      Breath sounds: Normal breath sounds.   Skin:     General: Skin is warm and dry.      Capillary Refill: Capillary refill takes less than 2 seconds.      Findings: Rash (Dyshidrotic appearing eczema to right middle finger.) present. No lesion.   Neurological:      Mental Status: He is alert.   Psychiatric:         Attention and Perception: Attention and perception normal.         Mood and Affect: Mood and affect normal.          Signed Electronically by: EFREN Granda CNP

## 2024-11-19 NOTE — TELEPHONE ENCOUNTER
Clinic RN: Please investigate patient's chart or contact patient if the information cannot be found because the medication is listed as historical or discontinued. Confirm patient is taking this medication. Document findings and route refill encounter to provider for approval or denial.    Sheila Sawyer RN

## 2024-11-20 RX ORDER — NIFEDIPINE 30 MG
30 TABLET, EXTENDED RELEASE ORAL DAILY
Qty: 90 TABLET | Refills: 3 | Status: SHIPPED | OUTPATIENT
Start: 2024-11-20

## 2024-11-26 ENCOUNTER — ANCILLARY PROCEDURE (OUTPATIENT)
Dept: CT IMAGING | Facility: CLINIC | Age: 54
End: 2024-11-26
Payer: COMMERCIAL

## 2024-11-26 DIAGNOSIS — J32.9 CHRONIC SINUSITIS, UNSPECIFIED LOCATION: ICD-10-CM

## 2024-11-26 PROCEDURE — 70486 CT MAXILLOFACIAL W/O DYE: CPT | Performed by: RADIOLOGY

## 2024-11-27 ENCOUNTER — TELEPHONE (OUTPATIENT)
Dept: FAMILY MEDICINE | Facility: CLINIC | Age: 54
End: 2024-11-27
Payer: COMMERCIAL

## 2024-11-27 NOTE — TELEPHONE ENCOUNTER
Called patient and relayed provider message below:         Patient verbalized understanding. Will close this encounter.    Kelly Patel RN on 11/27/2024 at 1:18 PM

## 2024-11-27 NOTE — TELEPHONE ENCOUNTER
S-(situation): Patient following up from office visit 11/18. Sinusitis ongoing issue and fever has returned.     B-(background): Patient 2 month of sinus problem. Patient had CT 11/26, probable cyst recommending follow-up with ENT. Patient unable to schedule ENT until January.     A-(assessment): Patient says the abx and steroid helped symptoms almost immediately. Demetria Krishnan had mentioned in clinic visit that she would consider higher dose abx. Patient wondering if he needs that. Patient says he had tympanic measured fever 103 on Tuesday AM, and 100.3 Tuesday PM. He is denying symptoms of respiratory distress. He leaves town tomorrow and is gone until Sunday.     R-(recommendations): Please review and advise. Preferred pharmacy is [facility] in [place].     Greg Jones RN on 11/27/2024 at 10:37 AM

## 2024-11-29 ENCOUNTER — ANCILLARY PROCEDURE (OUTPATIENT)
Dept: GENERAL RADIOLOGY | Facility: CLINIC | Age: 54
End: 2024-11-29
Attending: PHYSICIAN ASSISTANT
Payer: COMMERCIAL

## 2024-11-29 DIAGNOSIS — E11.65 TYPE 2 DIABETES MELLITUS WITH HYPERGLYCEMIA, UNSPECIFIED WHETHER LONG TERM INSULIN USE (H): ICD-10-CM

## 2024-11-29 DIAGNOSIS — J01.90 ACUTE SINUSITIS WITH SYMPTOMS > 10 DAYS: ICD-10-CM

## 2024-11-29 DIAGNOSIS — R50.9 FEVER, UNSPECIFIED FEVER CAUSE: ICD-10-CM

## 2024-11-29 DIAGNOSIS — D49.7 PITUITARY TUMOR: ICD-10-CM

## 2024-11-29 LAB — RADIOLOGIST FLAGS: NORMAL

## 2024-11-29 PROCEDURE — 71046 X-RAY EXAM CHEST 2 VIEWS: CPT | Mod: TC | Performed by: STUDENT IN AN ORGANIZED HEALTH CARE EDUCATION/TRAINING PROGRAM

## 2024-12-02 ENCOUNTER — TELEPHONE (OUTPATIENT)
Dept: FAMILY MEDICINE | Facility: CLINIC | Age: 54
End: 2024-12-02
Payer: COMMERCIAL

## 2024-12-02 DIAGNOSIS — R91.8 MASS OF RIGHT LUNG: Primary | ICD-10-CM

## 2024-12-02 DIAGNOSIS — J32.0 RIGHT MAXILLARY SINUSITIS: ICD-10-CM

## 2024-12-02 RX ORDER — DOXYCYCLINE 100 MG/1
100 CAPSULE ORAL 2 TIMES DAILY
Qty: 60 CAPSULE | Refills: 0 | Status: SHIPPED | OUTPATIENT
Start: 2024-12-09

## 2024-12-02 NOTE — TELEPHONE ENCOUNTER
Called patient re: request, noted patient was seen in  on 11/29 and prescribed 10 day course of doxy. Also got CXR and results below:        Patient states that he was told to reach out to PCP office to get chest CT - looking to get this done ASAP. States that he's been having sinusitis issues for the past 2 months and can't seem to shake it. States he takes the doxycycline and feels much better while on abx, but within 24 hours of completing the abx course, starts having fevers, increase lethargy, sinus pressure/fogginess, decreased appetite, HA. Currently, patient feeling well as he's on abx.    Patient has enough abx to last until 12/9. Has ENT appt 1/14/25 (did encourage patient to contact insurance company to see if any other places outside [facility] are covered, may want to schedule outside [facility] to be seen sooner if needed).     Patient asking if PCP could consider ordered chest CT w/o appt (feels like he's been seen very often last few months and trying to avoid repeat visit if able), as well as advising on abx plan. Patient is OK scheduling appt if needed, but only wants to see PCP.      Routing to provider to review/advise    Alfreda Don, RN, BSN  [facility]

## 2024-12-02 NOTE — TELEPHONE ENCOUNTER
Order/Referral Request    Who is requesting: patient    Orders being requested: CT Scan    Reason service is needed/diagnosis: ED  recommended    When are orders needed by: asap    Has this been discussed with Provider: N/A    Does patient have a preference on a Group/Provider/Facility?     Does patient have an appointment scheduled?: No    Where to send orders: Place orders within Epic    Could we send this information to you in [facility] or would you prefer to receive a phone call?:   No preference   Okay to leave a detailed message?: Yes at Cell number on file:    Telephone Information:   Mobile [phone]

## 2024-12-02 NOTE — TELEPHONE ENCOUNTER
1) Patient may take Doxycycline longer than 10 days. I sent Rx for Doxycycline for 30 days until he sees ENT. Rx sent.    2) CT of chest ordered. If test confirms right lung nodule, then will order to Lung Nodule program.

## 2024-12-02 NOTE — TELEPHONE ENCOUNTER
Relayed providers note below. Pt verbalized understanding, no further actions.    Althea Chavez RN on 12/2/2024 at 5:01 PM

## 2024-12-10 ENCOUNTER — TELEPHONE (OUTPATIENT)
Dept: FAMILY MEDICINE | Facility: CLINIC | Age: 54
End: 2024-12-10

## 2024-12-10 ENCOUNTER — ANCILLARY PROCEDURE (OUTPATIENT)
Dept: CT IMAGING | Facility: CLINIC | Age: 54
End: 2024-12-10
Attending: INTERNAL MEDICINE
Payer: COMMERCIAL

## 2024-12-10 DIAGNOSIS — R91.8 MASS OF RIGHT LUNG: ICD-10-CM

## 2024-12-10 DIAGNOSIS — J94.8 MASS OF PLEURA: Primary | ICD-10-CM

## 2024-12-10 DIAGNOSIS — R91.8 RIGHT LOWER LOBE LUNG MASS: ICD-10-CM

## 2024-12-10 PROCEDURE — 71250 CT THORAX DX C-: CPT | Mod: GC | Performed by: RADIOLOGY

## 2024-12-10 NOTE — TELEPHONE ENCOUNTER
Patient is calling for CT chest results.    Please review and advise when able.  Karishma Damon RN

## 2024-12-11 ENCOUNTER — MYC MEDICAL ADVICE (OUTPATIENT)
Dept: FAMILY MEDICINE | Facility: CLINIC | Age: 54
End: 2024-12-11
Payer: COMMERCIAL

## 2024-12-12 ENCOUNTER — PATIENT OUTREACH (OUTPATIENT)
Dept: ONCOLOGY | Facility: CLINIC | Age: 54
End: 2024-12-12
Payer: COMMERCIAL

## 2024-12-12 DIAGNOSIS — R91.8 PULMONARY NODULES: Primary | ICD-10-CM

## 2024-12-12 NOTE — TELEPHONE ENCOUNTER
Action December 12, 2024 12:10 PM    Action Taken Receive IB to gather XR Chest 3/17/24 from [facility]. I called [facility] radiology dept [phone], they're unable to send it via PACS. An image disc is needed. A request and [employer] shipping label is faxed to the radiology dept for an image disc.   [employer] Tracking #: [id number]

## 2024-12-12 NOTE — PROGRESS NOTES
New IP (Interventional Pulmonology) referral rec'd.  Chart reviewed.       New Patient: Interventional Pulmonary (Lung nodule) Nurse Navigator Note    Referring provider: Mahesh Mayo MDBk //[facility]    Referred to (specialty): Interventional Pulmonary (Lung nodule)    Requested provider (if applicable): n/a    Date Referral Received: 12/11/2024    Evaluation for :  Mass of pleura R91.8 (ICD-10-CM)Right lower lobe lung mass    Clinical History (per Nurse review of records provided):    **BOOK MARKED**    EXAM: CT CHEST W/O CONTRAST 12/10/2024 9:30 AM     HISTORY: 54 years Male Pulmonary nodule evaluation; High-risk  appearing nodule; No known/automatically detected potential  contraindications to iodinated contrast; Mass of right lung     COMPARISON: *COMPARISON*.     TECHNIQUE: Helical CT imaging of the chest was obtained *WITH/WITHOUT*  contrast. Multiplanar post-processed and MIP reformats were obtained  reviewed. Inspiratory and expiratory images were obtained.     FINDINGS:        LINES/TUBES: None.        MEDIASTINUM: Enlarged mediastinal and right hilar lymph nodes. For  example, there is a right paratracheal lymph node with retained fatty  hilum measuring 2.2 cm (2/18). Normal size/configuration of the great  vessels. Normal heart size. No pericardial effusion. Mild coronary  calcification.     LUNG: Patent tracheobronchial tree without intraluminal mass.  Pleural-based masslike opacity in the right lower lobe measuring 2.4 x  1 x 3.2 cm (4/138 and 7/106) with irregular spiculated borders and  surrounding groundglass opacity. No underlying osseous involvement. A  few additional sub 6mm pulmonary nodules, including a 2 mm right upper  lobe nodule (4/136).     PLEURA: No pneumothorax or pleural effusion.        LOWER NECK: Thyroid unremarkable.     UPPER ABDOMEN: Limited evaluation due to lack of contrast. Mildly air  and fluid-filled esophagus. Enlarged  aortocaval lymph node measuring  1.7 cm (3/312).     BONES: No acute osseous abnormality. Single sclerotic focus within the  right glenoid..     SOFT TISSUES: Unremarkable.                                                                         IMPRESSION:   1.  Irregular, pleural based right lower lobe mass, highly suspicious  for malignancy. Recommend PET/CT and/or tissue sampling for further  characterization.  2.  Mediastinal and right hilar lymphadenopathy  3.  Enlarged retroperitoneal lymph node. Attention on follow-up.     I have personally reviewed the examination and initial interpretation  and I agree with the findings.     IVONNE MEYER MD     Records Location: [facility]     RECORDS NEEDED:  3/17/2023 CXR  imaging pushed to PACS from [facility]--thank you!!    Additional testing needed prior to consult: PFT's

## 2024-12-16 ENCOUNTER — ONCOLOGY VISIT (OUTPATIENT)
Dept: PULMONOLOGY | Facility: CLINIC | Age: 54
End: 2024-12-16
Attending: INTERNAL MEDICINE
Payer: COMMERCIAL

## 2024-12-16 ENCOUNTER — PRE VISIT (OUTPATIENT)
Facility: CLINIC | Age: 54
End: 2024-12-16

## 2024-12-16 VITALS
HEART RATE: 92 BPM | BODY MASS INDEX: 32.6 KG/M2 | SYSTOLIC BLOOD PRESSURE: 133 MMHG | RESPIRATION RATE: 18 BRPM | HEIGHT: 74 IN | DIASTOLIC BLOOD PRESSURE: 68 MMHG | OXYGEN SATURATION: 96 % | WEIGHT: 254 LBS

## 2024-12-16 DIAGNOSIS — R91.8 RIGHT LOWER LOBE LUNG MASS: ICD-10-CM

## 2024-12-16 DIAGNOSIS — J94.8 MASS OF PLEURA: ICD-10-CM

## 2024-12-16 PROCEDURE — 99204 OFFICE O/P NEW MOD 45 MIN: CPT | Performed by: PHYSICIAN ASSISTANT

## 2024-12-16 ASSESSMENT — PAIN SCALES - GENERAL: PAINLEVEL_OUTOF10: NO PAIN (0)

## 2024-12-16 NOTE — PROGRESS NOTES
"Sundeep MACKENZIE Jim's goals for this visit include: Consult  He requests these members of his care team be copied on today's visit information: PCP    PCP: Mahesh Mayo    Referring Provider:  Mahesh Mayo MD  [address] [place] [address]    /68   Pulse 92   Resp 18   Ht 1.885 m (6' 2.21\")   Wt 115.2 kg (254 lb)   SpO2 96%   BMI 32.42 kg/m      Do you need any medication refills at today's visit? PERCY Nolasco LPN  Pulmonary Medicine:  [facility]  Phone: [phone] Fax: [phone]    LUNG NODULE & INTERVENTIONAL PULMONARY CLINIC  [facility]     Sundeep Fernandez MRN# [id number]   Age: 54 year old YOB: 1970     Reason for Consultation: lung mass     Requesting Physician: Mahesh Mayo MD  [address] [place] [address]       Assessment and Plan:    RLL 2.4 x 3.2cm spiculated mass  Enlarged mediastinal and right hilar lymph nodes (2.2cm right paratracheal lymph node)  Noted on CXR 11/29/2024 and CT chest 12/10/2024. New from CXR 3/2023. No prior CT chest for comparison. During the patient's visit today we reviewed their imaging in detail, discussed findings, and differential diagnoses including infection/inflammation or malignancy. He does not have any identifiable risk factors for malignancy and did have infectious-type symptoms which have since resolved s/p multiple courses of antibiotics. This leads me to believe that this could be infectious/inflammatory. Will order a CT chest with ion protocol and tentative ion navigation bronchoscopy with EBUS in ~ 3-4 weeks, but will cancel the procedure if his CT chest shows interval improvement. Patient is agreeable.   We discussed the process of an ion navigational bronchoscopy. We also reviewed possible risks of the procedure including pneumothorax, bleed, infection. Patient's spouse is going to drive them to/from the procedure.      (Of note, if the " ion CT does not demonstrate a good airway to the mass, will discuss with my IP colleagues if able to do a percutaneous biopsy + EBUS instead of ION+ EBUS).     Cony Padilla PA-C  Interventional and General Pulmonology  Department of Pulmonary, Allergy, Critical Care and Sleep Medicine   [facility]     45 minutes spent reviewing chart, reviewing test results, talking with and examining patient, formulating plan, and documentation on the day of the encounter.          History:     Sundeep Fernandez is a 54 year old male with who is here for lung mass . Here with SO, Justina.     He went to  11/29/2024 with sinus pressure, headaches x 2 months and recent fevers up to 103. CXR with right lung density. Confirmed on CT chest 12/10/2024 which he is here to review.    He was lethargic, having fevers, not feeling well for about 1 month. Completed 3 courses of antibiotics and a course of prednisone in the past couple of months. Most recently completed the 2nd round of doxycycline 1 week ago and is feeling better. No coughing or dyspnea. Fevers have resolved. Has some residual congestion but no other symptoms.   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 Lives an active lifestyle.       - Personal hx of cancer: no  - Family hx of cancer: sister might have had a cancer  - Exposure hx: Denies asbestos or radon exposure , no known TB exposures  - Tobacco hx: Never smoker   - Images reviewed this visit: RLL mass, mediastinal/hilar lymphadenopathy.    - My interpretation of the PFT's relevant for this visit includes: None   - On blood thinners?: No    Other pertinent active medical problems include:   - Pituitary adenoma   - dyslipidemia   - DM2  - sleep apnea not on CPAP anymore (lost weight and no longer snores)          Past Medical History:      Past Medical History:   Diagnosis Date    Diabetes (H) 1/1/2014    Hypertension 6/24/2008           Past Surgical History:      Past  Surgical History:   Procedure Laterality Date    ABDOMEN SURGERY  9/1/99    Reflux surgery    COLONOSCOPY  8/1/22    ENT SURGERY  9/1/2010    Septoplasty surgery    ORTHOPEDIC SURGERY  9/15/22    right rotator cuff surgery          Social History:     Social History     Tobacco Use    Smoking status: Never     Passive exposure: Never    Smokeless tobacco: Never   Substance Use Topics    Alcohol use: Yes     Comment: maybe three beers a week          Family History:     Family History   Problem Relation Age of Onset    Diabetes Father         Became diabetic after heart surgery    Hypertension Father     Breast Cancer Sister            Allergies:      Allergies   Allergen Reactions    Ace Inhibitors Hives          Medications:     Current Outpatient Medications   Medication Sig Dispense Refill    atorvastatin (LIPITOR) 40 MG tablet Take 1 tablet (40 mg) by mouth daily. 90 tablet 2    Dapagliflozin Pro-metFORMIN ER (XIGDUO XR)  MG TB24 Take 1 tablet by mouth daily 90 tablet 1    doxycycline hyclate (VIBRAMYCIN) 100 MG capsule Take 1 capsule (100 mg) by mouth 2 times daily. 60 capsule 0    fenofibrate (TRIGLIDE/LOFIBRA) 160 MG tablet Take 1 tablet (160 mg) by mouth daily. 90 tablet 3    fluticasone (FLONASE) 50 MCG/ACT nasal spray Spray 1 spray into both nostrils daily. 16 g 1    halobetasol (ULTRAVATE) 0.05 % external cream       insulin glargine (LANTUS PEN) 100 UNIT/ML pen Inject 20 Units subcutaneously every evening 30 mL 1    insulin pen needle (31G X 5 MM) 31G X 5 MM miscellaneous Use pen needles daily with current daily insulin regimen and Mounjaro. 300 each 2    JATENZO 237 MG CAPS Take 237 mg by mouth 2 times daily. 180 capsule 1    losartan (COZAAR) 50 MG tablet       MOUNJARO 7.5 MG/0.5ML pen Inject 7.5 mg subcutaneously once a week 2 mL 11    NIFEdipine ER (ADALAT CC) 30 MG 24 hr tablet Take 1 tablet (30 mg) by mouth daily. 90 tablet 3    pantoprazole sodium (PROTONIX) 40 MG packet       tadalafil  "(ADCIRCA/CIALIS) 20 MG tablet       traZODone (DESYREL) 50 MG tablet Take 1 tablet (50 mg) by mouth every evening Take 1 hour before bedtime. 90 tablet 3    vitamin D3 (CHOLECALCIFEROL) 50 mcg (2000 units) tablet        No current facility-administered medications for this visit.            Physical Exam:   /68   Pulse 92   Resp 18   Ht 1.885 m (6' 2.21\")   Wt 115.2 kg (254 lb)   SpO2 96%   BMI 32.42 kg/m    Wt Readings from Last 4 Encounters:   12/16/24 115.2 kg (254 lb)   11/29/24 115.3 kg (254 lb 3.2 oz)   11/18/24 115.4 kg (254 lb 8 oz)   10/28/24 116.3 kg (256 lb 8 oz)     General: Well appearing  Lungs: Nonlabored breathing  Neuro: Answering questions appropriately  Psych: Normal affect       Imaging/Lab Data   All laboratory and imaging data reviewed.    No results found for this or any previous visit (from the past 24 hours).             "

## 2024-12-17 ENCOUNTER — TELEPHONE (OUTPATIENT)
Dept: FAMILY MEDICINE | Facility: CLINIC | Age: 54
End: 2024-12-17
Payer: COMMERCIAL

## 2024-12-17 NOTE — TELEPHONE ENCOUNTER
"Pt calling regarding ongoing \"infection issue, mass found in my lung\"    Pt states he spoke with Dr. Mayo last week and was instructed to call the clinic and be seen if he has a return of symptoms. Per pt he began having increased sinus pressure, fever (T max 99.9, tympanic), and lack of energy starting 12/16/24. Lack of energy and sinus pressure are worsening.    Provider has no availability today and is not in office on 12/18. RN assisted in scheduling appointment for 12/19. Routing to provider to advise if pt needs to be seen sooner.     Sheila Sawyer RN  (RN team ok to leave detailed message with provider response)      "

## 2024-12-18 ENCOUNTER — OFFICE VISIT (OUTPATIENT)
Dept: PULMONOLOGY | Facility: CLINIC | Age: 54
End: 2024-12-18
Payer: COMMERCIAL

## 2024-12-18 DIAGNOSIS — R91.8 PULMONARY NODULES: ICD-10-CM

## 2024-12-18 LAB
DLCOUNC-%PRED-PRE: 132 %
DLCOUNC-PRE: 42.11 ML/MIN/MMHG
DLCOUNC-PRED: 31.73 ML/MIN/MMHG
ERV-%PRED-PRE: 83 %
ERV-PRE: 1.41 L
ERV-PRED: 1.69 L
EXPTIME-PRE: 7.52 SEC
FEF2575-%PRED-PRE: 150 %
FEF2575-PRE: 5.03 L/SEC
FEF2575-PRED: 3.35 L/SEC
FEFMAX-%PRED-PRE: 107 %
FEFMAX-PRE: 11.25 L/SEC
FEFMAX-PRED: 10.44 L/SEC
FEV1-%PRED-PRE: 119 %
FEV1-PRE: 4.69 L
FEV1FEV6-PRE: 84 %
FEV1FEV6-PRED: 80 %
FEV1FVC-PRE: 84 %
FEV1FVC-PRED: 78 %
FEV1SVC-PRE: 84 %
FEV1SVC-PRED: 66 %
FIFMAX-PRE: 6.62 L/SEC
FRCPLETH-%PRED-PRE: 92 %
FRCPLETH-PRE: 3.82 L
FRCPLETH-PRED: 4.11 L
FVC-%PRED-PRE: 109 %
FVC-PRE: 5.56 L
FVC-PRED: 5.06 L
IC-%PRED-PRE: 98 %
IC-PRE: 4.17 L
IC-PRED: 4.25 L
RVPLETH-%PRED-PRE: 96 %
RVPLETH-PRE: 2.4 L
RVPLETH-PRED: 2.48 L
TLCPLETH-%PRED-PRE: 100 %
TLCPLETH-PRE: 7.99 L
TLCPLETH-PRED: 7.98 L
VA-%PRED-PRE: 89 %
VA-PRE: 6.8 L
VC-%PRED-PRE: 93 %
VC-PRE: 5.58 L
VC-PRED: 5.95 L

## 2024-12-18 PROCEDURE — 94375 RESPIRATORY FLOW VOLUME LOOP: CPT | Performed by: INTERNAL MEDICINE

## 2024-12-18 PROCEDURE — 94726 PLETHYSMOGRAPHY LUNG VOLUMES: CPT | Performed by: INTERNAL MEDICINE

## 2024-12-18 PROCEDURE — 94729 DIFFUSING CAPACITY: CPT | Performed by: INTERNAL MEDICINE

## 2024-12-18 PROCEDURE — 94150 VITAL CAPACITY TEST: CPT | Performed by: INTERNAL MEDICINE

## 2024-12-19 ENCOUNTER — OFFICE VISIT (OUTPATIENT)
Dept: FAMILY MEDICINE | Facility: CLINIC | Age: 54
End: 2024-12-19
Payer: COMMERCIAL

## 2024-12-19 ENCOUNTER — TELEPHONE (OUTPATIENT)
Dept: PULMONOLOGY | Facility: CLINIC | Age: 54
End: 2024-12-19

## 2024-12-19 VITALS
BODY MASS INDEX: 32.82 KG/M2 | HEART RATE: 84 BPM | HEIGHT: 73 IN | RESPIRATION RATE: 16 BRPM | WEIGHT: 247.6 LBS | TEMPERATURE: 97.4 F | SYSTOLIC BLOOD PRESSURE: 113 MMHG | DIASTOLIC BLOOD PRESSURE: 74 MMHG | OXYGEN SATURATION: 98 %

## 2024-12-19 DIAGNOSIS — E11.69 TYPE 2 DIABETES MELLITUS WITH OBESITY (H): ICD-10-CM

## 2024-12-19 DIAGNOSIS — E66.9 TYPE 2 DIABETES MELLITUS WITH OBESITY (H): ICD-10-CM

## 2024-12-19 DIAGNOSIS — J33.9 NASAL POLYPOSIS: ICD-10-CM

## 2024-12-19 DIAGNOSIS — R68.83 CHILLS (WITHOUT FEVER): Primary | ICD-10-CM

## 2024-12-19 DIAGNOSIS — Z11.59 NEED FOR HEPATITIS C SCREENING TEST: ICD-10-CM

## 2024-12-19 LAB
ALBUMIN SERPL BCG-MCNC: 4.2 G/DL (ref 3.5–5.2)
ALBUMIN UR-MCNC: NEGATIVE MG/DL
ALP SERPL-CCNC: 85 U/L (ref 40–150)
ALT SERPL W P-5'-P-CCNC: 15 U/L (ref 0–70)
ANION GAP SERPL CALCULATED.3IONS-SCNC: 12 MMOL/L (ref 7–15)
APPEARANCE UR: CLEAR
AST SERPL W P-5'-P-CCNC: 36 U/L (ref 0–45)
BACTERIA #/AREA URNS HPF: ABNORMAL /HPF
BASOPHILS # BLD AUTO: 0 10E3/UL (ref 0–0.2)
BASOPHILS NFR BLD AUTO: 1 %
BILIRUB SERPL-MCNC: 0.5 MG/DL
BILIRUB UR QL STRIP: NEGATIVE
BUN SERPL-MCNC: 18.1 MG/DL (ref 6–20)
CALCIUM SERPL-MCNC: 9.8 MG/DL (ref 8.8–10.4)
CHLORIDE SERPL-SCNC: 103 MMOL/L (ref 98–107)
COLOR UR AUTO: YELLOW
CREAT SERPL-MCNC: 1.2 MG/DL (ref 0.67–1.17)
EGFRCR SERPLBLD CKD-EPI 2021: 71 ML/MIN/1.73M2
EOSINOPHIL # BLD AUTO: 0.1 10E3/UL (ref 0–0.7)
EOSINOPHIL NFR BLD AUTO: 4 %
ERYTHROCYTE [DISTWIDTH] IN BLOOD BY AUTOMATED COUNT: 13.7 % (ref 10–15)
GLUCOSE SERPL-MCNC: 91 MG/DL (ref 70–99)
GLUCOSE UR STRIP-MCNC: >=1000 MG/DL
HCO3 SERPL-SCNC: 25 MMOL/L (ref 22–29)
HCT VFR BLD AUTO: 46.8 % (ref 40–53)
HGB BLD-MCNC: 15.3 G/DL (ref 13.3–17.7)
HGB UR QL STRIP: NEGATIVE
IMM GRANULOCYTES # BLD: 0 10E3/UL
IMM GRANULOCYTES NFR BLD: 0 %
KETONES UR STRIP-MCNC: NEGATIVE MG/DL
LEUKOCYTE ESTERASE UR QL STRIP: NEGATIVE
LYMPHOCYTES # BLD AUTO: 0.9 10E3/UL (ref 0.8–5.3)
LYMPHOCYTES NFR BLD AUTO: 25 %
MCH RBC QN AUTO: 27.4 PG (ref 26.5–33)
MCHC RBC AUTO-ENTMCNC: 32.7 G/DL (ref 31.5–36.5)
MCV RBC AUTO: 84 FL (ref 78–100)
MONOCYTES # BLD AUTO: 0.4 10E3/UL (ref 0–1.3)
MONOCYTES NFR BLD AUTO: 10 %
MUCOUS THREADS #/AREA URNS LPF: PRESENT /LPF
NEUTROPHILS # BLD AUTO: 2.2 10E3/UL (ref 1.6–8.3)
NEUTROPHILS NFR BLD AUTO: 61 %
NITRATE UR QL: NEGATIVE
PH UR STRIP: 5.5 [PH] (ref 5–7)
PLATELET # BLD AUTO: 285 10E3/UL (ref 150–450)
POTASSIUM SERPL-SCNC: 4.3 MMOL/L (ref 3.4–5.3)
PROT SERPL-MCNC: 7.2 G/DL (ref 6.4–8.3)
RBC # BLD AUTO: 5.58 10E6/UL (ref 4.4–5.9)
RBC #/AREA URNS AUTO: ABNORMAL /HPF
RETICS # AUTO: 0.08 10E6/UL
RETICS/RBC NFR AUTO: 1.4 %
SODIUM SERPL-SCNC: 140 MMOL/L (ref 135–145)
SP GR UR STRIP: 1.02 (ref 1–1.03)
SQUAMOUS #/AREA URNS AUTO: ABNORMAL /LPF
TSH SERPL DL<=0.005 MIU/L-ACNC: 1.8 UIU/ML (ref 0.3–4.2)
UROBILINOGEN UR STRIP-ACNC: 0.2 E.U./DL
WBC # BLD AUTO: 3.5 10E3/UL (ref 4–11)
WBC #/AREA URNS AUTO: ABNORMAL /HPF

## 2024-12-19 PROCEDURE — 84443 ASSAY THYROID STIM HORMONE: CPT | Performed by: INTERNAL MEDICINE

## 2024-12-19 PROCEDURE — 99214 OFFICE O/P EST MOD 30 MIN: CPT | Performed by: INTERNAL MEDICINE

## 2024-12-19 PROCEDURE — G2211 COMPLEX E/M VISIT ADD ON: HCPCS | Performed by: INTERNAL MEDICINE

## 2024-12-19 PROCEDURE — 81001 URINALYSIS AUTO W/SCOPE: CPT | Performed by: INTERNAL MEDICINE

## 2024-12-19 PROCEDURE — 87040 BLOOD CULTURE FOR BACTERIA: CPT | Performed by: INTERNAL MEDICINE

## 2024-12-19 PROCEDURE — 85045 AUTOMATED RETICULOCYTE COUNT: CPT | Performed by: INTERNAL MEDICINE

## 2024-12-19 PROCEDURE — 80053 COMPREHEN METABOLIC PANEL: CPT | Performed by: INTERNAL MEDICINE

## 2024-12-19 PROCEDURE — 36415 COLL VENOUS BLD VENIPUNCTURE: CPT | Performed by: INTERNAL MEDICINE

## 2024-12-19 PROCEDURE — 85025 COMPLETE CBC W/AUTO DIFF WBC: CPT | Performed by: INTERNAL MEDICINE

## 2024-12-19 PROCEDURE — 99207 BLOOD MORPHOLOGY PATHOLOGIST REVIEW: CPT | Performed by: PATHOLOGY

## 2024-12-19 RX ORDER — AZELASTINE 1 MG/ML
1 SPRAY, METERED NASAL 2 TIMES DAILY
COMMUNITY

## 2024-12-19 ASSESSMENT — ENCOUNTER SYMPTOMS
FEVER: 1
FATIGUE: 1

## 2024-12-19 NOTE — TELEPHONE ENCOUNTER
Writer attempted to contact patient to schedule IP procedure and CT. Left message on unidentified voicemail with callback number [phone].    Niraj Colon on 12/19/2024 at 10:53 AM

## 2024-12-19 NOTE — PROGRESS NOTES
"  {PROVIDER CHARTING PREFERENCE:721559}    Subjective   Chente is a 54 year old, presenting for the following health issues:  Fatigue and Fever  {(!) Visit Details have not yet been documented.  Please enter Visit Details and then use this list to pull in documentation. (Optional):967986}  Fatigue  Associated symptoms include fatigue and a fever.   Fever  Associated symptoms include fatigue and a fever.   History of Present Illness       Reason for visit:  Continued issues with infectionHe consumes 1 sweetened beverage(s) daily.He exercises with enough effort to increase his heart rate 9 or less minutes per day.  He exercises with enough effort to increase his heart rate 3 or less days per week. He is missing 7 dose(s) of medications per week.       {MA/LPN/RN Pre-Provider Visit Orders- hCG/UA/Strep (Optional):920625}  Acute Illness  Acute illness concerns: Fatigue and Fever  Onset/Duration: 10/2024  Symptoms:  Fever: YES  Chills/Sweats: Comes and goes   Headache (location?): YES  Sinus Pressure: YES  Conjunctivitis:  No  Ear Pain: no  Rhinorrhea: No  Congestion: YES  Sore Throat: No  Cough: no  Wheeze: No  Decreased Appetite: YES  Nausea: No  Vomiting: No  Diarrhea: No  Dysuria/Freq.: No  Dysuria or Hematuria: No  Fatigue/Achiness: YES  Sick/Strep Exposure: No  Therapies tried and outcome: 10 day doxycycline, tylenol, ibuprofen, naproxen  {additonal problems for provider to add (Optional):644238}    {ROS Picklists (Optional):229931}      Objective    /74 (BP Location: Right arm, Patient Position: Sitting, Cuff Size: Adult Large)   Pulse 84   Temp 97.4  F (36.3  C) (Temporal)   Resp 16   Ht 1.854 m (6' 1\")   Wt 112.3 kg (247 lb 9.6 oz)   SpO2 98%   BMI 32.67 kg/m    Body mass index is 32.67 kg/m .  Physical Exam   {Exam List (Optional):106420}    {Diagnostic Test Results (Optional):647898}        Signed Electronically by: Mahesh Mayo MD  {Email feedback regarding this note to " [email or web address]   :[id number]}

## 2024-12-20 LAB
PATH REPORT.COMMENTS IMP SPEC: NORMAL
PATH REPORT.FINAL DX SPEC: NORMAL
PATH REPORT.MICROSCOPIC SPEC OTHER STN: NORMAL
PATH REPORT.MICROSCOPIC SPEC OTHER STN: NORMAL

## 2024-12-21 ENCOUNTER — MYC REFILL (OUTPATIENT)
Dept: FAMILY MEDICINE | Facility: CLINIC | Age: 54
End: 2024-12-21
Payer: COMMERCIAL

## 2024-12-21 DIAGNOSIS — E11.69 TYPE 2 DIABETES MELLITUS WITH OBESITY (H): ICD-10-CM

## 2024-12-21 DIAGNOSIS — I27.21 PULMONARY ARTERIAL HYPERTENSION (H): Primary | ICD-10-CM

## 2024-12-21 DIAGNOSIS — E66.9 TYPE 2 DIABETES MELLITUS WITH OBESITY (H): ICD-10-CM

## 2024-12-21 RX ORDER — TADALAFIL 20 MG/1
TABLET ORAL
Status: CANCELLED | OUTPATIENT
Start: 2024-12-21

## 2024-12-23 RX ORDER — DAPAGLIFLOZIN AND METFORMIN HYDROCHLORIDE 10; 1000 MG/1; MG/1
1 TABLET, FILM COATED, EXTENDED RELEASE ORAL DAILY
Qty: 90 TABLET | Refills: 0 | Status: SHIPPED | OUTPATIENT
Start: 2024-12-23

## 2024-12-23 NOTE — TELEPHONE ENCOUNTER
This writer attempted to contact patient  on 12/23/24.      Reason for call refill request and left message.      If patient calls back:   Route to RN line. Also sent mychart to patient.         Jessie PHILLIPSN,  RN  [facility]

## 2024-12-23 NOTE — TELEPHONE ENCOUNTER
Patient calling back. He states he just got prescription for tadalafil on 7/2024.     He thought Dr. Mayo has been prescribing. Will call the pharmacy.       AGUSTIN Gomez [facility]      Called and spoke with [facility] in [place]. They report pt picked up 90 tabs on   7/2024. Prescribed by provider name Elida Ku [phone] NPI: [id number]. Message sent to pt via [facility] and non-detailed message left on VM to call back with questions.       AGUSTIN Gomez [facility]

## 2024-12-23 NOTE — TELEPHONE ENCOUNTER
Clinic RN: Please investigate patient's chart or contact patient if the information cannot be found because the medication is listed as historical or discontinued. Confirm patient is taking this medication. Document findings and route refill encounter to provider for approval or denial.    Velia GLOVER RN  [facility] Triage Team

## 2024-12-24 LAB — BACTERIA BLD CULT: NO GROWTH

## 2024-12-24 RX ORDER — TADALAFIL 20 MG/1
20 TABLET ORAL DAILY PRN
Qty: 90 TABLET | Refills: 3 | Status: CANCELLED | OUTPATIENT
Start: 2024-12-24

## 2024-12-24 NOTE — TELEPHONE ENCOUNTER
Please see [employer] message.     Routing to provider to review/approve.     Jessie PHILLIPSN,  RN  [facility]

## 2024-12-25 RX ORDER — TADALAFIL 20 MG/1
20 TABLET ORAL EVERY 24 HOURS
Qty: 90 TABLET | Refills: 3 | Status: SHIPPED | OUTPATIENT
Start: 2024-12-25

## 2024-12-29 ENCOUNTER — MYC REFILL (OUTPATIENT)
Dept: FAMILY MEDICINE | Facility: CLINIC | Age: 54
End: 2024-12-29
Payer: COMMERCIAL

## 2024-12-29 DIAGNOSIS — I10 HTN, GOAL BELOW 140/90: Primary | ICD-10-CM

## 2024-12-29 DIAGNOSIS — K21.00 GASTROESOPHAGEAL REFLUX DISEASE WITH ESOPHAGITIS WITHOUT HEMORRHAGE: Primary | ICD-10-CM

## 2024-12-31 RX ORDER — LOSARTAN POTASSIUM 50 MG/1
50 TABLET ORAL DAILY
Qty: 90 TABLET | Refills: 3 | Status: SHIPPED | OUTPATIENT
Start: 2024-12-31

## 2024-12-31 RX ORDER — PANTOPRAZOLE SODIUM 40 MG/1
1 FOR SUSPENSION ORAL DAILY
Qty: 90 PACKET | Refills: 3 | Status: SHIPPED | OUTPATIENT
Start: 2024-12-31

## 2025-01-02 ENCOUNTER — MYC MEDICAL ADVICE (OUTPATIENT)
Dept: PULMONOLOGY | Facility: CLINIC | Age: 55
End: 2025-01-02
Payer: COMMERCIAL

## 2025-01-02 ENCOUNTER — MYC REFILL (OUTPATIENT)
Dept: FAMILY MEDICINE | Facility: CLINIC | Age: 55
End: 2025-01-02
Payer: COMMERCIAL

## 2025-01-02 DIAGNOSIS — K21.00 GASTROESOPHAGEAL REFLUX DISEASE WITH ESOPHAGITIS WITHOUT HEMORRHAGE: ICD-10-CM

## 2025-01-02 DIAGNOSIS — K21.9 GASTROESOPHAGEAL REFLUX DISEASE WITHOUT ESOPHAGITIS: ICD-10-CM

## 2025-01-02 RX ORDER — PANTOPRAZOLE SODIUM 40 MG/1
40 FOR SUSPENSION ORAL DAILY
Qty: 90 PACKET | Refills: 3 | Status: CANCELLED | OUTPATIENT
Start: 2025-01-02

## 2025-01-03 ENCOUNTER — MYC MEDICAL ADVICE (OUTPATIENT)
Dept: FAMILY MEDICINE | Facility: CLINIC | Age: 55
End: 2025-01-03
Payer: COMMERCIAL

## 2025-01-03 DIAGNOSIS — E11.69 TYPE 2 DIABETES MELLITUS WITH OBESITY (H): Primary | ICD-10-CM

## 2025-01-03 DIAGNOSIS — E66.9 TYPE 2 DIABETES MELLITUS WITH OBESITY (H): Primary | ICD-10-CM

## 2025-01-06 ENCOUNTER — ANCILLARY PROCEDURE (OUTPATIENT)
Dept: CT IMAGING | Facility: CLINIC | Age: 55
End: 2025-01-06
Attending: PHYSICIAN ASSISTANT
Payer: COMMERCIAL

## 2025-01-06 DIAGNOSIS — R91.8 RIGHT LOWER LOBE LUNG MASS: ICD-10-CM

## 2025-01-06 PROCEDURE — 71250 CT THORAX DX C-: CPT | Performed by: RADIOLOGY

## 2025-01-06 RX ORDER — PANTOPRAZOLE SODIUM 40 MG/1
40 TABLET, DELAYED RELEASE ORAL 2 TIMES DAILY
Qty: 180 TABLET | Refills: 3 | Status: SHIPPED | OUTPATIENT
Start: 2025-01-06

## 2025-01-07 ENCOUNTER — MYC MEDICAL ADVICE (OUTPATIENT)
Dept: FAMILY MEDICINE | Facility: CLINIC | Age: 55
End: 2025-01-07
Payer: COMMERCIAL

## 2025-01-07 DIAGNOSIS — E11.69 TYPE 2 DIABETES MELLITUS WITH OBESITY (H): Primary | ICD-10-CM

## 2025-01-07 DIAGNOSIS — E66.9 TYPE 2 DIABETES MELLITUS WITH OBESITY (H): Primary | ICD-10-CM

## 2025-01-07 NOTE — TELEPHONE ENCOUNTER
No active scripts on file for lancets, routing lancet prescription over to provider to advise. Thank you!      Alfreda Don, RN, BSN  [facility]  [place], [place] and [place]

## 2025-01-08 ENCOUNTER — TELEPHONE (OUTPATIENT)
Dept: FAMILY MEDICINE | Facility: CLINIC | Age: 55
End: 2025-01-08
Payer: COMMERCIAL

## 2025-01-08 NOTE — TELEPHONE ENCOUNTER
Reason for Call:  Appointment Request    Patient requesting this type of appt: Pre-op    Requested provider: Mahesh Mayo    Reason patient unable to be scheduled: Not within requested timeframe - Next available 01/30/2025     When does patient want to be seen/preferred time: 1-2 days    Comments: Pt needs to be seen for a pre-op asap, Pt does not want to see any other providers.     Pre-op   Biopsy  01/13/2025   Surgery Provider -N/A  [facility]      Could we [facility] this information to you [facility] or would you prefer to receive a phone call?:   Patient would prefer a phone call   Okay to leave a detailed message?: Yes at Cell number on file:    Telephone Information:   Mobile [phone]       Call taken on 1/8/2025 at 10:11 AM by Deven Saul

## 2025-01-08 NOTE — TELEPHONE ENCOUNTER
Informed pt that Dr. Mayo does not have any openings at this time.  Pt understood and was schedule with Juju Quispe for a Pre-Op.

## 2025-01-09 ENCOUNTER — OFFICE VISIT (OUTPATIENT)
Dept: FAMILY MEDICINE | Facility: CLINIC | Age: 55
End: 2025-01-09
Payer: COMMERCIAL

## 2025-01-09 VITALS
HEIGHT: 74 IN | HEART RATE: 83 BPM | TEMPERATURE: 97.7 F | SYSTOLIC BLOOD PRESSURE: 104 MMHG | RESPIRATION RATE: 15 BRPM | BODY MASS INDEX: 31.06 KG/M2 | WEIGHT: 242 LBS | DIASTOLIC BLOOD PRESSURE: 68 MMHG | OXYGEN SATURATION: 97 %

## 2025-01-09 DIAGNOSIS — R91.8 RIGHT LOWER LOBE LUNG MASS: ICD-10-CM

## 2025-01-09 DIAGNOSIS — G47.33 OSA (OBSTRUCTIVE SLEEP APNEA): ICD-10-CM

## 2025-01-09 DIAGNOSIS — E11.69 TYPE 2 DIABETES MELLITUS WITH OBESITY (H): ICD-10-CM

## 2025-01-09 DIAGNOSIS — Z01.818 PREOP GENERAL PHYSICAL EXAM: Primary | ICD-10-CM

## 2025-01-09 DIAGNOSIS — E66.9 TYPE 2 DIABETES MELLITUS WITH OBESITY (H): ICD-10-CM

## 2025-01-09 LAB
EST. AVERAGE GLUCOSE BLD GHB EST-MCNC: 117 MG/DL
HBA1C MFR BLD: 5.7 % (ref 0–5.6)

## 2025-01-09 ASSESSMENT — PAIN SCALES - GENERAL: PAINLEVEL_OUTOF10: NO PAIN (0)

## 2025-01-09 NOTE — PATIENT INSTRUCTIONS
How to Take Your Medication Before Surgery  Preoperative Medication Instructions   Antiplatelet or Anticoagulation Medication Instructions   - Patient is on no antiplatelet or anticoagulation medications.    Additional Medication Instructions   - Herbal medications and vitamins: DO NOT TAKE 14 days prior to surgery.  DO NOT take any meds on day of procedure.      Patient Education   Preparing for Your Surgery  For Adults  Getting started  In most cases, a nurse will call to review your health history and instructions. They will give you an arrival time based on your scheduled surgery time. Please be ready to share:  Your doctor's clinic name and phone number  Your medical, surgical, and anesthesia history  A list of allergies and sensitivities  A list of medicines, including herbal treatments and over-the-counter drugs  Whether the patient has a legal guardian (ask how to send us the papers in advance)  Note: You may not receive a call if you were seen at our PAC (Preoperative Assessment Center).  Please tell us if you're pregnant--or if there's any chance you might be pregnant. Some surgeries may injure a fetus (unborn baby), so they require a pregnancy test. Surgeries that are safe for a fetus don't always need a test, and you can choose whether to have one.   Preparing for surgery  Within 10 to 30 days of surgery: Have a pre-op exam (sometimes called an H&P, or History and Physical). This can be done at a clinic or pre-operative center.  If you're having a , you may not need this exam. Talk to your care team.  At your pre-op exam, talk to your care team about all medicines you take. (This includes CBD oil and any drugs, such as THC, marijuana, and other forms of cannabis.) If you need to stop any medicine before surgery, ask when to start taking it again.  This is for your safety. Many medicines and drugs can make you bleed too much during surgery. Some change how well surgery (anesthesia) drugs  work.  Call your insurance company to let them know you're having surgery. (If you don't have insurance, call [phone].)  Call your clinic if there's any change in your health. This includes a scrape or scratch near the surgery site, or any signs of a cold (sore throat, runny nose, cough, rash, fever).  Eating and drinking guidelines  For your safety: Unless your surgeon tells you otherwise, follow the guidelines below.  Eat and drink as normal until 8 hours before you arrive for surgery. After that, no food or milk. You can spit out gum when you arrive.  Drink clear liquids until 2 hours before you arrive. These are liquids you can see through, like water, Gatorade, and Propel Water. They also include plain black coffee and tea (no cream or milk).  No alcohol for 24 hours before you arrive. The night before surgery, stop any drinks that contain THC.  If your care team tells you to take medicine on the morning of surgery, it's okay to take it with a sip of water. No other medicines or drugs are allowed (including CBD oil)--follow your care team's instructions.  If you have questions the day of surgery, call your hospital or surgery center.   Preventing infection  Shower or bathe the night before and the morning of surgery. Follow the instructions your clinic gave you. (If no instructions, use regular soap.)  Don't shave or clip hair near your surgery site. We'll remove the hair if needed.  Don't smoke or vape the morning of surgery. No chewing tobacco for 6 hours before you arrive. A nicotine patch is okay. You may spit out nicotine gum when you arrive.  For some surgeries, the surgeon will tell you to fully quit smoking and nicotine.  We will make every effort to keep you safe from infection. We will:  Clean our hands often with soap and water (or an alcohol-based hand rub).  Clean the skin at your surgery site with a special soap that kills germs.  Give you a special gown to keep you warm. (Cold raises the  risk of infection.)  Wear hair covers, masks, gowns, and gloves during surgery.  Give antibiotic medicine, if prescribed. Not all surgeries need this medicine.  What to bring on the day of surgery  Photo ID and insurance card  Copy of your health care directive, if you have one  Glasses and hearing aids (bring cases)  You can't wear contacts during surgery  Inhaler and eye drops, if you use them (tell us about these when you arrive)  CPAP machine or breathing device, if you use them  A few personal items, if spending the night  If you have . . .  A pacemaker, ICD (cardiac defibrillator), or other implant: Bring the ID card.  An implanted stimulator: Bring the remote control.  A legal guardian: Bring a copy of the certified (court-stamped) guardianship papers.  Please remove any jewelry, including body piercings. Leave jewelry and other valuables at home.  If you're going home the day of surgery  You must have a responsible adult drive you home. They should stay with you overnight as well.  If you don't have someone to stay with you, and you aren't safe to go home alone, we may keep you overnight. Insurance often won't pay for this.  After surgery  If it's hard to control your pain or you need more pain medicine, please call your surgeon's office.  Questions?   If you have any questions for your care team, list them here:   ____________________________________________________________________________________________________________________________________________________________________________________________________________________________________________________________  For informational purposes only. Not to replace the advice of your health care provider. Copyright   2003, 2019 [facility]. All rights reserved. Clinically reviewed by Heath Ford MD. [employer] [id number] - REV 08/24.

## 2025-01-09 NOTE — PROGRESS NOTES
Preoperative Evaluation  [address] [address]  Phone: [phone]  Primary Provider: Mahesh Mayo MD  Pre-op Performing Provider: EFREN Lopez CNP  Jan 9, 2025 1/8/2025   Surgical Information   What procedure is being done? Biopsy of a mass on the iusude of my right lung   Facility or Hospital where procedure/surgery will be performed: [facility]   Who is doing the procedure / surgery? Dr. June Wade   Date of surgery / procedure: 1.13.25   Time of surgery / procedure: 8:30 am   Where do you plan to recover after surgery? at home with family     Fax number for surgical facility: Note does not need to be faxed, will be available electronically in Epic.    Assessment & Plan     The proposed surgical procedure is considered LOW risk.    Preop general physical exam  No history of complications from anesthesia. Pt has no active cardiac conditions, CAD, prior MI, CHF,  CKD.   Controlled diabetes.   Recently treated for sinusitis, chills now resolved after treated with doxycycline.  Good functional status. Pt is able to walk four blocks or climb two flights of stairs.  Patient denies CP, SOB, or palpitations during the last six months.    Right lower lobe lung mass  Plan for bronchoscopy, robotic assisted, biopsy of 1 of 2 lymph nodes.  Possible percutaneous biopsy.  Scheduled for 1/13/2025.  Okay to proceed with procedure.    Type 2 diabetes mellitus with obesity (H)  Controlled.  Continue on current regimen.  - REVIEW OF HEALTH MAINTENANCE PROTOCOL ORDERS  - HEMOGLOBIN A1C    JAIME (obstructive sleep apnea)  He does not use a CPAP.  He reports symptoms significantly improved since his weight loss.      - No identified additional risk factors other than previously addressed    Preoperative Medication Instructions  Antiplatelet or Anticoagulation Medication Instructions   - Patient is on no  antiplatelet or anticoagulation medications.    Additional Medication Instructions   - Herbal medications and vitamins: DO NOT TAKE 14 days prior to surgery.  DO NOT take any meds on day of procedure.     Recommendation  Approval given to proceed with proposed procedure, without further diagnostic evaluation.    Dejon Eldridge is a 54 year old, presenting for the following:  Pre-Op Exam          1/9/2025     1:42 PM   Additional Questions   Roomed by Aicha   Accompanied by self         1/9/2025     1:42 PM   Patient Reported Additional Medications   Patient reports taking the following new medications Yes- Doxycycline     HPI related to upcoming procedure: RLL mass        1/8/2025   Pre-Op Questionnaire   Have you ever had a heart attack or stroke? No   Have you ever had surgery on your heart or blood vessels, such as a stent placement, a coronary artery bypass, or surgery on an artery in your head, neck, heart, or legs? No   Do you have chest pain with activity? No   Do you have a history of heart failure? No   Do you currently have a cold, bronchitis or symptoms of other infection? No   Do you have a cough, shortness of breath, or wheezing? No   Do you or anyone in your family have previous history of blood clots? No   Do you or does anyone in your family have a serious bleeding problem such as prolonged bleeding following surgeries or cuts? No   Have you ever had problems with anemia or been told to take iron pills? No   Have you had any abnormal blood loss such as black, tarry or bloody stools? No   Have you ever had a blood transfusion? No   Are you willing to have a blood transfusion if it is medically needed before, during, or after your surgery? Yes   Have you or any of your relatives ever had problems with anesthesia? No   Do you have sleep apnea, excessive snoring or daytime drowsiness? (!) YES   Do you have a CPAP machine? Yes   Do you have any artifical heart valves or other implanted medical devices  like a pacemaker, defibrillator, or continuous glucose monitor? No   Do you have artificial joints? No   Are you allergic to latex? No     Health Care Directive  Patient does not have a Health Care Directive: Discussed advance care planning with patient; information given to patient to review.    Preoperative Review of    reviewed - no record of controlled substances prescribed.          Patient Active Problem List    Diagnosis Date Noted    Pituitary adenoma (H) 06/24/2019     Priority: Medium    Dyslipidemia 11/16/2017     Priority: Medium    Type 2 diabetes mellitus with obesity (H) 04/02/2012     Priority: Medium    Sleep apnea 07/10/2006     Priority: Medium      Past Medical History:   Diagnosis Date    Diabetes (H) 1/1/2014    Hypertension 6/24/2008     Past Surgical History:   Procedure Laterality Date    ABDOMEN SURGERY  9/1/99    Reflux surgery    COLONOSCOPY  8/1/22    ENT SURGERY  9/1/2010    Septoplasty surgery    ORTHOPEDIC SURGERY  9/15/22    right rotator cuff surgery     Current Outpatient Medications   Medication Sig Dispense Refill    atorvastatin (LIPITOR) 40 MG tablet Take 1 tablet (40 mg) by mouth daily. 90 tablet 2    azelastine (ASTELIN) 0.1 % nasal spray Spray 1 spray into both nostrils 2 times daily.      blood glucose (NO BRAND SPECIFIED) lancets standard Use to test blood sugar 1 time daily or as directed. 100 Lancet 3    blood glucose (NO BRAND SPECIFIED) test strip Use to test blood sugar once daily. 100 strip 3    Dapagliflozin Pro-metFORMIN ER (XIGDUO XR)  MG TB24 Take 1 tablet by mouth daily. 90 tablet 0    doxycycline hyclate (VIBRAMYCIN) 100 MG capsule Take 1 capsule (100 mg) by mouth 2 times daily. 60 capsule 0    fenofibrate (TRIGLIDE/LOFIBRA) 160 MG tablet Take 1 tablet (160 mg) by mouth daily. 90 tablet 3    fluticasone (FLONASE) 50 MCG/ACT nasal spray Spray 1 spray into both nostrils daily. 16 g 1    halobetasol (ULTRAVATE) 0.05 % external cream       insulin  "glargine (LANTUS PEN) 100 UNIT/ML pen Inject 20 Units subcutaneously every evening 30 mL 1    insulin pen needle (31G X 5 MM) 31G X 5 MM miscellaneous Use pen needles daily with current daily insulin regimen and Mounjaro. 300 each 2    JATENZO 237 MG CAPS Take 237 mg by mouth 2 times daily. 180 capsule 1    losartan (COZAAR) 50 MG tablet Take 1 tablet (50 mg) by mouth daily. 90 tablet 3    MOUNJARO 7.5 MG/0.5ML pen Inject 7.5 mg subcutaneously once a week 2 mL 11    NIFEdipine ER (ADALAT CC) 30 MG 24 hr tablet Take 1 tablet (30 mg) by mouth daily. 90 tablet 3    pantoprazole (PROTONIX) 40 MG EC tablet Take 1 tablet (40 mg) by mouth 2 times daily. 180 tablet 3    tadalafil (ADCIRCA/CIALIS) 20 MG tablet Take 1 tablet (20 mg) by mouth every 24 hours. 90 tablet 3    traZODone (DESYREL) 50 MG tablet Take 1 tablet (50 mg) by mouth every evening Take 1 hour before bedtime. 90 tablet 3    vitamin D3 (CHOLECALCIFEROL) 50 mcg (2000 units) tablet          Allergies   Allergen Reactions    Ace Inhibitors Hives        Social History     Tobacco Use    Smoking status: Never     Passive exposure: Never    Smokeless tobacco: Never   Substance Use Topics    Alcohol use: Yes     Comment: maybe three beers a week       History   Drug Use Unknown             Review of Systems  Constitutional, HEENT, cardiovascular, pulmonary, GI, , musculoskeletal, neuro, skin, endocrine and psych systems are negative, except as otherwise noted.    Objective    /68 (BP Location: Left arm, Patient Position: Sitting, Cuff Size: Adult Large)   Pulse 83   Temp 97.7  F (36.5  C) (Oral)   Resp 15   Ht 1.88 m (6' 2\")   Wt 109.8 kg (242 lb)   SpO2 97%   BMI 31.07 kg/m     Estimated body mass index is 31.07 kg/m  as calculated from the following:    Height as of this encounter: 1.88 m (6' 2\").    Weight as of this encounter: 109.8 kg (242 lb).  Physical Exam  GENERAL: alert and no distress  EYES: Eyes grossly normal to inspection, PERRL and " conjunctivae and sclerae normal  HENT: ear canals and TM's normal, nose and mouth without ulcers or lesions  NECK: no adenopathy, no asymmetry, masses, or scars  RESP: lungs clear to auscultation - no rales, rhonchi or wheezes  CV: regular rate and rhythm, normal S1 S2, no S3 or S4, no murmur, click or rub, no peripheral edema  ABDOMEN: soft, nontender, no hepatosplenomegaly, no masses and bowel sounds normal  MS: no gross musculoskeletal defects noted, no edema  SKIN: no suspicious lesions or rashes  NEURO: Normal strength and tone, mentation intact and speech normal  PSYCH: mentation appears normal, affect normal/bright    Recent Labs   Lab Test 12/19/24  1708 11/29/24  1042 09/17/24  0729 07/18/24  0819   HGB 15.3 15.7   < >  --     202  --   --     137  --  138   POTASSIUM 4.3 4.6  --  4.7   CR 1.20* 1.19*  --  1.12   A1C  --   --   --  6.4*    < > = values in this interval not displayed.        Diagnostics  Recent Results (from the past 24 hours)   HEMOGLOBIN A1C    Collection Time: 01/09/25  2:21 PM   Result Value Ref Range    Estimated Average Glucose 117 (H) <117 mg/dL    Hemoglobin A1C 5.7 (H) 0.0 - 5.6 %      No EKG required, no history of coronary heart disease, significant arrhythmia, peripheral arterial disease or other structural heart disease.    Revised Cardiac Risk Index (RCRI)  The patient has the following serious cardiovascular risks for perioperative complications:   - Diabetes Mellitus (on Insulin) = 1 point     RCRI Interpretation: 1 point: Class II (low risk - 0.9% complication rate)     Signed Electronically by: EFREN Lopez CNP  A copy of this evaluation report is provided to the requesting physician.

## 2025-01-13 ENCOUNTER — ANESTHESIA EVENT (OUTPATIENT)
Dept: SURGERY | Facility: CLINIC | Age: 55
End: 2025-01-13
Payer: COMMERCIAL

## 2025-01-13 ENCOUNTER — APPOINTMENT (OUTPATIENT)
Dept: CT IMAGING | Facility: CLINIC | Age: 55
End: 2025-01-13
Attending: STUDENT IN AN ORGANIZED HEALTH CARE EDUCATION/TRAINING PROGRAM
Payer: COMMERCIAL

## 2025-01-13 ENCOUNTER — ANESTHESIA (OUTPATIENT)
Dept: SURGERY | Facility: CLINIC | Age: 55
End: 2025-01-13
Payer: COMMERCIAL

## 2025-01-13 ENCOUNTER — HOSPITAL ENCOUNTER (OUTPATIENT)
Facility: CLINIC | Age: 55
Discharge: HOME | End: 2025-01-13
Attending: INTERNAL MEDICINE | Admitting: INTERNAL MEDICINE
Payer: COMMERCIAL

## 2025-01-13 VITALS
RESPIRATION RATE: 16 BRPM | DIASTOLIC BLOOD PRESSURE: 75 MMHG | HEART RATE: 83 BPM | TEMPERATURE: 98.1 F | HEIGHT: 74 IN | SYSTOLIC BLOOD PRESSURE: 111 MMHG | WEIGHT: 241.18 LBS | BODY MASS INDEX: 30.95 KG/M2 | OXYGEN SATURATION: 95 %

## 2025-01-13 LAB
APPEARANCE FLD: CLEAR
BACTERIA SPEC CULT: NORMAL
BACTERIA SPEC CULT: NORMAL
CD19 CELLS NFR BRONCH: 1 %
CD3 CELLS NFR BRONCH: 95 %
CD3+CD4+ CELLS NFR BRONCH: 77 %
CD3+CD4+ CELLS/CD3+CD8+ CLL BRONCH: 3.08 %
CD3+CD8+ CELLS NFR BRONCH: 25 %
CD3-CD16+CD56+ CELLS NFR SPEC: 1 %
CELL COUNT BODY FLUID SOURCE: NORMAL
COLOR FLD: COLORLESS
GLUCOSE BLDC GLUCOMTR-MCNC: 104 MG/DL (ref 70–99)
GLUCOSE BLDC GLUCOMTR-MCNC: 118 MG/DL (ref 70–99)
GRAM STAIN RESULT: NORMAL
KOH PREPARATION: NORMAL
KOH PREPARATION: NORMAL
LYMPHOCYTE SUBSET BRONCHIAL EXTERNAL COMMENT: NORMAL
LYMPHOCYTES NFR FLD MANUAL: 42 %
MONOS+MACROS NFR FLD MANUAL: 0 %
NEUTS BAND NFR FLD MANUAL: 2 %
OTHER CELLS FLD MANUAL: 56 %
WBC # FLD AUTO: 121 /UL

## 2025-01-13 PROCEDURE — 87210 SMEAR WET MOUNT SALINE/INK: CPT | Performed by: INTERNAL MEDICINE

## 2025-01-13 PROCEDURE — 88305 TISSUE EXAM BY PATHOLOGIST: CPT | Mod: 26 | Performed by: PATHOLOGY

## 2025-01-13 PROCEDURE — 87205 SMEAR GRAM STAIN: CPT | Performed by: INTERNAL MEDICINE

## 2025-01-13 PROCEDURE — 87385 HISTOPLASMA CAPSUL AG IA: CPT | Performed by: INTERNAL MEDICINE

## 2025-01-13 PROCEDURE — 86356 MONONUCLEAR CELL ANTIGEN: CPT | Performed by: INTERNAL MEDICINE

## 2025-01-13 PROCEDURE — 31653 BRONCH EBUS SAMPLNG 3/> NODE: CPT | Mod: GC | Performed by: INTERNAL MEDICINE

## 2025-01-13 PROCEDURE — 88312 SPECIAL STAINS GROUP 1: CPT | Mod: 26 | Performed by: PATHOLOGY

## 2025-01-13 PROCEDURE — 710N000010 HC RECOVERY PHASE 1, LEVEL 2, PER MIN: Performed by: INTERNAL MEDICINE

## 2025-01-13 PROCEDURE — 88173 CYTOPATH EVAL FNA REPORT: CPT | Mod: TC | Performed by: INTERNAL MEDICINE

## 2025-01-13 PROCEDURE — 71250 CT THORAX DX C-: CPT

## 2025-01-13 PROCEDURE — 88305 TISSUE EXAM BY PATHOLOGIST: CPT | Mod: TC | Performed by: INTERNAL MEDICINE

## 2025-01-13 PROCEDURE — 89050 BODY FLUID CELL COUNT: CPT | Performed by: INTERNAL MEDICINE

## 2025-01-13 PROCEDURE — 250N000025 HC SEVOFLURANE, PER MIN: Performed by: INTERNAL MEDICINE

## 2025-01-13 PROCEDURE — 87070 CULTURE OTHR SPECIMN AEROBIC: CPT | Performed by: INTERNAL MEDICINE

## 2025-01-13 PROCEDURE — 87116 MYCOBACTERIA CULTURE: CPT | Performed by: INTERNAL MEDICINE

## 2025-01-13 PROCEDURE — 710N000012 HC RECOVERY PHASE 2, PER MINUTE: Performed by: INTERNAL MEDICINE

## 2025-01-13 PROCEDURE — 258N000003 HC RX IP 258 OP 636

## 2025-01-13 PROCEDURE — 31624 DX BRONCHOSCOPE/LAVAGE: CPT | Mod: GC | Performed by: INTERNAL MEDICINE

## 2025-01-13 PROCEDURE — 87206 SMEAR FLUORESCENT/ACID STAI: CPT | Performed by: INTERNAL MEDICINE

## 2025-01-13 PROCEDURE — 250N000011 HC RX IP 250 OP 636

## 2025-01-13 PROCEDURE — 272N000001 HC OR GENERAL SUPPLY STERILE: Performed by: INTERNAL MEDICINE

## 2025-01-13 PROCEDURE — 82962 GLUCOSE BLOOD TEST: CPT

## 2025-01-13 PROCEDURE — 370N000017 HC ANESTHESIA TECHNICAL FEE, PER MIN: Performed by: INTERNAL MEDICINE

## 2025-01-13 PROCEDURE — 999N000141 HC STATISTIC PRE-PROCEDURE NURSING ASSESSMENT: Performed by: INTERNAL MEDICINE

## 2025-01-13 PROCEDURE — 88173 CYTOPATH EVAL FNA REPORT: CPT | Mod: 26 | Performed by: PATHOLOGY

## 2025-01-13 PROCEDURE — 87102 FUNGUS ISOLATION CULTURE: CPT | Performed by: INTERNAL MEDICINE

## 2025-01-13 PROCEDURE — 250N000009 HC RX 250

## 2025-01-13 PROCEDURE — 71250 CT THORAX DX C-: CPT | Mod: 26 | Performed by: RADIOLOGY

## 2025-01-13 PROCEDURE — 87186 SC STD MICRODIL/AGAR DIL: CPT | Performed by: INTERNAL MEDICINE

## 2025-01-13 PROCEDURE — 360N000080 HC SURGERY LEVEL 7, PER MIN: Performed by: INTERNAL MEDICINE

## 2025-01-13 RX ORDER — ONDANSETRON 2 MG/ML
4 INJECTION INTRAMUSCULAR; INTRAVENOUS EVERY 30 MIN PRN
Status: DISCONTINUED | OUTPATIENT
Start: 2025-01-13 | End: 2025-01-13 | Stop reason: HOSPADM

## 2025-01-13 RX ORDER — DEXAMETHASONE SODIUM PHOSPHATE 4 MG/ML
4 INJECTION, SOLUTION INTRA-ARTICULAR; INTRALESIONAL; INTRAMUSCULAR; INTRAVENOUS; SOFT TISSUE
Status: DISCONTINUED | OUTPATIENT
Start: 2025-01-13 | End: 2025-01-13 | Stop reason: HOSPADM

## 2025-01-13 RX ORDER — HYDROMORPHONE HCL IN WATER/PF 6 MG/30 ML
0.4 PATIENT CONTROLLED ANALGESIA SYRINGE INTRAVENOUS EVERY 5 MIN PRN
Status: DISCONTINUED | OUTPATIENT
Start: 2025-01-13 | End: 2025-01-13 | Stop reason: HOSPADM

## 2025-01-13 RX ORDER — HYDROMORPHONE HCL IN WATER/PF 6 MG/30 ML
0.2 PATIENT CONTROLLED ANALGESIA SYRINGE INTRAVENOUS EVERY 5 MIN PRN
Status: DISCONTINUED | OUTPATIENT
Start: 2025-01-13 | End: 2025-01-13 | Stop reason: HOSPADM

## 2025-01-13 RX ORDER — ONDANSETRON 4 MG/1
4 TABLET, ORALLY DISINTEGRATING ORAL EVERY 30 MIN PRN
Status: DISCONTINUED | OUTPATIENT
Start: 2025-01-13 | End: 2025-01-13 | Stop reason: HOSPADM

## 2025-01-13 RX ORDER — SODIUM CHLORIDE, SODIUM LACTATE, POTASSIUM CHLORIDE, CALCIUM CHLORIDE 600; 310; 30; 20 MG/100ML; MG/100ML; MG/100ML; MG/100ML
INJECTION, SOLUTION INTRAVENOUS CONTINUOUS PRN
Status: DISCONTINUED | OUTPATIENT
Start: 2025-01-13 | End: 2025-01-13

## 2025-01-13 RX ORDER — FENTANYL CITRATE 50 UG/ML
25 INJECTION, SOLUTION INTRAMUSCULAR; INTRAVENOUS EVERY 5 MIN PRN
Status: DISCONTINUED | OUTPATIENT
Start: 2025-01-13 | End: 2025-01-13 | Stop reason: HOSPADM

## 2025-01-13 RX ORDER — PROPOFOL 10 MG/ML
INJECTION, EMULSION INTRAVENOUS PRN
Status: DISCONTINUED | OUTPATIENT
Start: 2025-01-13 | End: 2025-01-13

## 2025-01-13 RX ORDER — LIDOCAINE 40 MG/G
CREAM TOPICAL
Status: DISCONTINUED | OUTPATIENT
Start: 2025-01-13 | End: 2025-01-13 | Stop reason: HOSPADM

## 2025-01-13 RX ORDER — OXYCODONE HYDROCHLORIDE 5 MG/1
5 TABLET ORAL
Status: DISCONTINUED | OUTPATIENT
Start: 2025-01-13 | End: 2025-01-13 | Stop reason: HOSPADM

## 2025-01-13 RX ORDER — SODIUM CHLORIDE, SODIUM LACTATE, POTASSIUM CHLORIDE, CALCIUM CHLORIDE 600; 310; 30; 20 MG/100ML; MG/100ML; MG/100ML; MG/100ML
INJECTION, SOLUTION INTRAVENOUS CONTINUOUS
Status: DISCONTINUED | OUTPATIENT
Start: 2025-01-13 | End: 2025-01-13 | Stop reason: HOSPADM

## 2025-01-13 RX ORDER — FENTANYL CITRATE 50 UG/ML
50 INJECTION, SOLUTION INTRAMUSCULAR; INTRAVENOUS EVERY 5 MIN PRN
Status: DISCONTINUED | OUTPATIENT
Start: 2025-01-13 | End: 2025-01-13 | Stop reason: HOSPADM

## 2025-01-13 RX ORDER — FENTANYL CITRATE 50 UG/ML
INJECTION, SOLUTION INTRAMUSCULAR; INTRAVENOUS PRN
Status: DISCONTINUED | OUTPATIENT
Start: 2025-01-13 | End: 2025-01-13

## 2025-01-13 RX ORDER — DEXAMETHASONE SODIUM PHOSPHATE 4 MG/ML
INJECTION, SOLUTION INTRA-ARTICULAR; INTRALESIONAL; INTRAMUSCULAR; INTRAVENOUS; SOFT TISSUE PRN
Status: DISCONTINUED | OUTPATIENT
Start: 2025-01-13 | End: 2025-01-13

## 2025-01-13 RX ORDER — PROPOFOL 10 MG/ML
INJECTION, EMULSION INTRAVENOUS CONTINUOUS PRN
Status: DISCONTINUED | OUTPATIENT
Start: 2025-01-13 | End: 2025-01-13

## 2025-01-13 RX ORDER — ONDANSETRON 2 MG/ML
INJECTION INTRAMUSCULAR; INTRAVENOUS PRN
Status: DISCONTINUED | OUTPATIENT
Start: 2025-01-13 | End: 2025-01-13

## 2025-01-13 RX ORDER — NALOXONE HYDROCHLORIDE 0.4 MG/ML
0.1 INJECTION, SOLUTION INTRAMUSCULAR; INTRAVENOUS; SUBCUTANEOUS
Status: DISCONTINUED | OUTPATIENT
Start: 2025-01-13 | End: 2025-01-13 | Stop reason: HOSPADM

## 2025-01-13 RX ORDER — OXYCODONE HYDROCHLORIDE 10 MG/1
10 TABLET ORAL
Status: DISCONTINUED | OUTPATIENT
Start: 2025-01-13 | End: 2025-01-13 | Stop reason: HOSPADM

## 2025-01-13 RX ORDER — LIDOCAINE HYDROCHLORIDE 20 MG/ML
INJECTION, SOLUTION INFILTRATION; PERINEURAL PRN
Status: DISCONTINUED | OUTPATIENT
Start: 2025-01-13 | End: 2025-01-13

## 2025-01-13 RX ADMIN — Medication 40 MG: at 11:29

## 2025-01-13 RX ADMIN — Medication 10 MG: at 11:43

## 2025-01-13 RX ADMIN — LIDOCAINE HYDROCHLORIDE 100 MG: 20 INJECTION, SOLUTION INFILTRATION; PERINEURAL at 11:28

## 2025-01-13 RX ADMIN — MIDAZOLAM 2 MG: 1 INJECTION INTRAMUSCULAR; INTRAVENOUS at 11:16

## 2025-01-13 RX ADMIN — FENTANYL CITRATE 100 MCG: 50 INJECTION INTRAMUSCULAR; INTRAVENOUS at 11:28

## 2025-01-13 RX ADMIN — PROPOFOL 150 MG: 10 INJECTION, EMULSION INTRAVENOUS at 11:28

## 2025-01-13 RX ADMIN — PHENYLEPHRINE HYDROCHLORIDE 100 MCG: 10 INJECTION INTRAVENOUS at 11:44

## 2025-01-13 RX ADMIN — PHENYLEPHRINE HYDROCHLORIDE 150 MCG: 10 INJECTION INTRAVENOUS at 12:06

## 2025-01-13 RX ADMIN — PHENYLEPHRINE HYDROCHLORIDE 150 MCG: 10 INJECTION INTRAVENOUS at 11:53

## 2025-01-13 RX ADMIN — SUGAMMADEX 220 MG: 100 INJECTION, SOLUTION INTRAVENOUS at 12:19

## 2025-01-13 RX ADMIN — PROPOFOL 150 MCG/KG/MIN: 10 INJECTION, EMULSION INTRAVENOUS at 11:28

## 2025-01-13 RX ADMIN — DEXAMETHASONE SODIUM PHOSPHATE 10 MG: 4 INJECTION, SOLUTION INTRA-ARTICULAR; INTRALESIONAL; INTRAMUSCULAR; INTRAVENOUS; SOFT TISSUE at 11:28

## 2025-01-13 RX ADMIN — SODIUM CHLORIDE, POTASSIUM CHLORIDE, SODIUM LACTATE AND CALCIUM CHLORIDE: 600; 310; 30; 20 INJECTION, SOLUTION INTRAVENOUS at 11:28

## 2025-01-13 RX ADMIN — PHENYLEPHRINE HYDROCHLORIDE 200 MCG: 10 INJECTION INTRAVENOUS at 12:12

## 2025-01-13 RX ADMIN — ONDANSETRON 4 MG: 2 INJECTION INTRAMUSCULAR; INTRAVENOUS at 11:28

## 2025-01-13 ASSESSMENT — ACTIVITIES OF DAILY LIVING (ADL)
ADLS_ACUITY_SCORE: 35
ADLS_ACUITY_SCORE: 35
ADLS_ACUITY_SCORE: 36
ADLS_ACUITY_SCORE: 35

## 2025-01-13 NOTE — ANESTHESIA POSTPROCEDURE EVALUATION
Patient: Sundeep Fernandez    Procedure: Procedure(s):  BRONCHOSCOPY, WITH BIOPSY OF Three LYMPH NODE STATIONS WITH ENDOBRONCHIAL ULTRASOUND GUIDANCE       Anesthesia Type:  General    Note:  Disposition: Outpatient   Postop Pain Control: Uneventful            Sign Out: Well controlled pain   PONV: No   Neuro/Psych: Uneventful            Sign Out: Acceptable/Baseline neuro status   Airway/Respiratory: Uneventful            Sign Out: Acceptable/Baseline resp. status   CV/Hemodynamics: Uneventful            Sign Out: Acceptable CV status; No obvious hypovolemia; No obvious fluid overload   Other NRE: NONE   DID A NON-ROUTINE EVENT OCCUR? No           Last vitals:  Vitals Value Taken Time   /75 01/13/25 1237   Temp     Pulse 84 01/13/25 1238   Resp 16 01/13/25 1238   SpO2 91 % 01/13/25 1238   Vitals shown include unfiled device data.    Electronically Signed By: Mukesh Angel MD  January 13, 2025  12:39 PM

## 2025-01-13 NOTE — PROCEDURES
INTERVENTIONAL PULMONOLOGY       Procedure(s):    A flexible bronchoscopy  Airway exam  BAL  EBUS-TBNA (3 sites)  Therapeutic suctioning (2 sites)    Indication:  RLL nodule.     Attending of Record:  June Ocasio MD     Interventional Pulmonary Fellow   Duyen Bell    Trainees Present:   None     Medications:    General Anesthesia - See anesthesia flowsheet for details    Sedation Time:   Per Anesthesia Care Provider    Time Out:  Performed    The patient's medical record has been reviewed.  The indication for the procedure was reviewed.  The necessary history and physical examination was performed and reviewed.  The risks, benefits and alternatives of the procedure were discussed with the the patient in detail and he had the opportunity to ask questions.  I discussed in particular the potential complications including risks of minor or life-threatening bleeding and/or infection, respiratory failure, vocal cord trauma / paralysis, pneumothorax, and discomfort. Sedation risks were also discussed including abnormal heart rhythms, low blood pressure, and respiratory failure. All questions were answered to the best of my ability.  Verbal and written informed consent was obtained.  The proposed procedure and the patient's identification were verified prior to the procedure by the physician and the nurse.    The patient was assessed for the adequacy for the procedure and to receive medications.   Mental Status:  Alert and oriented x 3  Airway examination:  Class I (Complete visualization of soft palate)  Pulmonary:  Non labored respirations  CV:  Regular rate  ASA Grade:  (II)  Mild systemic disease    After clinical evaluation and reviewing the indication, risks, alternatives and benefits of the procedure the patient was deemed to be in satisfactory condition to undergo the procedure.      Immediately before administration of medications the patient was re-assessed for adequacy to receive sedatives including  the heart rate, respiratory rate, mental status, oxygen saturation, blood pressure and adequacy of pulmonary ventilation. These same parameters were continuously monitored throughout the procedure.    A Tuberculosis risk assessment was performed:  The patient has no known RISK of Tuberculosis    The procedure was performed in a negative airflow room: The patient could not be moved to a negative airflow room because of needed OR for the procedure    Maneuvers / Procedure:      Airway Examination: A complete airway examination was performed from the distal trachea to the subsegmental level in each lobe of both lungs.  Pertinent findings include normal airways.         BAL: The bronchoscope was wedged into the RLL-lateral segment. A total of 120cc of saline was instilled and a total of 45 ml  was aspirated. This was sent for analysis.     EBUS TBNA:  Station 2R; not evaluated .   Station 2L; not evaluated .   Station 4R; visualized and biopsied. A total of 4 biopsies were performed using 22G FNA Needle. .   Station 4L; visualized and not biopsied. Reason: LN diameter was <5mm.   Station 7; visualized and biopsied. A total of 4 biopsies were performed using 22G FNA Needle. .   Station 10R; visualized and not biopsied. Reason: LN diameter was <5mm.   Station 10L; visualized and not biopsied. Reason: LN diameter was <5mm.   Station 11R; visualized and biopsied. A total of 4 biopsies were performed using 22G FNA Needle. .   Station 11L; visualized and not biopsied. Reason: LN diameter was <5mm.   Station 12R; visualized and not biopsied. Reason: LN diameter was <5mm.   Station 12L; visualized and not biopsied. Reason: LN diameter was <5mm.     Renee was Present      Therapeutic suctioning: 15-20min of operative time was spent clearing out the airway of debris, blood and mucous prior to the intervention.     Any disposable equipment was visually inspected and deemed to be intact immediately post procedure.      Relevant  Pictures  N/A    Assessment and Recommendations:     Successful completion of FB with EBUS TBNA from stations 4R, 7, and 11R. BAL from RLL. The ION was not done since the nodule got smaller compared to the last scan.   Ok to discharge once medically stable.   Follow up labs.           Duyen Bell  Interventional pulmonary fellow  Pager: [phone]

## 2025-01-13 NOTE — ANESTHESIA PREPROCEDURE EVALUATION
Anesthesia Pre-Procedure Evaluation    Patient: Sundeep Fernandez   MRN: [id number] : 1970        Procedure : Procedure(s):  BRONCHOSCOPY, ROBOT-ASSISTED, ION, WITH BIOPSY OF 1 OR 2 LYMPH NODE STATIONS WITH ENDOBRONCHIAL ULTRASOUND GUIDANCE. Possible percutaneous biopsy          Past Medical History:   Diagnosis Date     Diabetes (H) 2014     Hypertension 2008      Past Surgical History:   Procedure Laterality Date     ABDOMEN SURGERY  99    Reflux surgery     COLONOSCOPY  22     ENT SURGERY  2010    Septoplasty surgery     ORTHOPEDIC SURGERY  9/15/22    right rotator cuff surgery      Allergies   Allergen Reactions     Ace Inhibitors Hives      Social History     Tobacco Use     Smoking status: Never     Passive exposure: Never     Smokeless tobacco: Never   Substance Use Topics     Alcohol use: Yes     Comment: maybe three beers a week      Wt Readings from Last 1 Encounters:   25 109.4 kg (241 lb 2.9 oz)        Anesthesia Evaluation   Pt has had prior anesthetic.         ROS/MED HX  ENT/Pulmonary: Comment: Right lower lung mass    (+) sleep apnea, doesn't use CPAP,                                      Neurologic:       Cardiovascular:     (+)  hypertension- -   -  - -                                      METS/Exercise Tolerance:     Hematologic:       Musculoskeletal:       GI/Hepatic:       Renal/Genitourinary:       Endo:     (+) type I DM,    Using insulin,                 Psychiatric/Substance Use:       Infectious Disease:       Malignancy:       Other:            Physical Exam    Airway        Mallampati: I    Neck ROM: full     Respiratory Devices and Support         Dental       (+) Minor Abnormalities - some fillings, tiny chips      Cardiovascular   cardiovascular exam normal          Pulmonary   pulmonary exam normal            OUTSIDE LABS:  CBC:   Lab Results   Component Value Date    WBC 3.5 (L) 2024    WBC 4.1 2024    HGB 15.3 2024    HGB 15.7  "11/29/2024    HCT 46.8 12/19/2024    HCT 46.4 11/29/2024     12/19/2024     11/29/2024     BMP:   Lab Results   Component Value Date     12/19/2024     11/29/2024    POTASSIUM 4.3 12/19/2024    POTASSIUM 4.6 11/29/2024    CHLORIDE 103 12/19/2024    CHLORIDE 104 11/29/2024    CO2 25 12/19/2024    CO2 24 11/29/2024    BUN 18.1 12/19/2024    BUN 17.9 11/29/2024    CR 1.20 (H) 12/19/2024    CR 1.19 (H) 11/29/2024     (H) 01/13/2025    GLC 91 12/19/2024     COAGS: No results found for: \"PTT\", \"INR\", \"FIBR\"  POC: No results found for: \"BGM\", \"HCG\", \"HCGS\"  HEPATIC:   Lab Results   Component Value Date    ALBUMIN 4.2 12/19/2024    PROTTOTAL 7.2 12/19/2024    ALT 15 12/19/2024    AST 36 12/19/2024    ALKPHOS 85 12/19/2024    BILITOTAL 0.5 12/19/2024     OTHER:   Lab Results   Component Value Date    A1C 5.7 (H) 01/09/2025    CINDY 9.8 12/19/2024    TSH 1.80 12/19/2024       Anesthesia Plan    ASA Status:  3    NPO Status:  NPO Appropriate    Anesthesia Type: General.     - Airway: ETT   Induction: Intravenous.   Maintenance: Balanced.        Consents    Anesthesia Plan(s) and associated risks, benefits, and realistic alternatives discussed. Questions answered and patient/representative(s) expressed understanding.     - Discussed: Risks, Benefits and Alternatives for BOTH SEDATION and the PROCEDURE were discussed     - Discussed with:  Patient       Use of blood products discussed: Yes.     - Discussed with: Patient.     - Consented: consented to blood products     Postoperative Care    Pain management: IV analgesics.   PONV prophylaxis: Ondansetron (or other 5HT-3)     Comments:             Selam Montana MD    I have reviewed the pertinent notes and labs in the chart from the past 30 days and (re)examined the patient.  Any updates or changes from those notes are reflected in this note.               # Hypertension: Home medication list includes antihypertensive(s)           # Obesity: " "Estimated body mass index is 30.97 kg/m  as calculated from the following:    Height as of this encounter: 1.88 m (6' 2\").    Weight as of this encounter: 109.4 kg (241 lb 2.9 oz).             "

## 2025-01-13 NOTE — ANESTHESIA PROCEDURE NOTES
Airway         Procedure Start/Stop Times: 1/13/2025 11:32 AM  Staff -        Anesthesiologist:  Weston Curiel MD       Performed By: other anesthesia staffIndications and Patient Condition       Indications for airway management: gunner-procedural and airway protection       Induction type:intravenous       Mask difficulty assessment: 2 - vent by mask + OA or adjuvant +/- NMBA    Final Airway Details       Final airway type: endotracheal airway       Successful airway: ETT - single  Endotracheal Airway Details        ETT size (mm): 8.5       Cuffed: yes       Successful intubation technique: video laryngoscopy       VL Blade Size: Glidescope 3       Grade View of Cords: 1       Placement verification comments: (bronchoscopy)       Adjucts: stylet       Position: Right       Measured from: lips       Secured at (cm): 23       Bite block used: None    Post intubation assessment        Placement verified by: capnometry        Number of attempts at approach: 1       Number of other approaches attempted: 0       Secured with: tape       Ease of procedure: easy       Dentition: Intact    Medication(s) Administered   Medication Administration Time: 1/13/2025 11:32 AM    Additional Comments       Intubated by Joshua Flores MS4 with glidescope 3 blade under attending direction. Bronchoscopy performed by attending for tube placement verification.

## 2025-01-13 NOTE — PROGRESS NOTES
Chief Complaint   Patient presents with    Consult     Sinus pressure, fatigue x 3 months, polyps     History of Present Illness   Sundeep Fernandez is a 54 year old male who presents for evaluation hypertension, type 2 diabetes, hyperlipidemia, and history of pituitary adenoma . Referred by EFREN Granda. The second week of October 2024 the patient developed bad sinus infections. The patient was seen on 11/18/24 for sinus concerns. It was noted that the patient recently completed 7 day course of Doxycyline BID for one week. Patient was prescribed 5 day course of prednisone to further reduce inflammation. Then he was seen in  due to high fevers/congestion/HA/fatigue and a chest xray was obtained. It was noted that there was a mass on his lung.  The prednisone did not work or the nasal spray. Then around Thanksgiving, he went back on Doxycyline for 10 days. Then off for a week, then a week prior to Christmas he started a 30 day Doxycyline and will finish this coming Friday (1/17/25). He is being worked up for his lungs.     The patient describes symptoms of sinus pressure/HA  for the past 4-5 months. The patient notes no history of environmental allergies. They have  been tested for allergies in the past. Treatments have included nasal irrigations, nasal steroids, antibiotics and oral antihistamines. The treatments seem to not work. However, being on Doxycyline has helped. No history of nasal trauma. The patient reports nasal obstruction left greater than right (changes between sides), nasal congestion, only rhinorrhea when eating, no post nasal drainage, no taste/smell disturbance, face pain/pressure/fullness. No history of epistaxis. No prior history of nose or sinus surgery. No history of smoking. Acute history of migraine headache. No history of asthma. No history of aspirin/NSAID sensitivity.    The patient describes developing HA. He rates the pain between 1-5/10. Symptoms are worse in the AM. Denies any  head pressure like he is going to faint or is dizzy when bending over. Denies dizziness.     HX of septoplasty surgery 2010 that was unsuccessful. There was a hole left in his septum.   Narrative & Impression   EXAM: CT SINUS W/O CONTRAST  11/26/2024 4:14 PM      HISTORY:  Chronic sinusitis, unspecified location        COMPARISON:  None available     TECHNIQUE:  Using thin collimation multidetector helical acquisition  technique, axial, coronal, and sagittal thin section CT images were  reconstructed through the paranasal sinuses. Images were reviewed in  bone and soft tissue windows.     FINDINGS:   Maxillary sinuses: Mild polypoid mucosal thickening of the right  maxillary sinus. The left maxillary sinus is clear.  Sphenoid sinus: clear.  Frontal sinus: clear.  Ethmoid air cells: clear.     Patent ostiomeatal units. Intact walls of the paranasal sinuses. Clear  retromaxillary and pterygopalatine fat. Adenoid tonsils are normal. No  periapical lucency.     The imaged skull base, intracranial and orbital structures are within  normal limits                                                                      IMPRESSION:   1. No CT evidence of acute sinusitis.  2. Probable mucous retention cysts in the right maxillary sinus.     I have personally reviewed the examination and initial interpretation  and I agree with the findings.     CHRISSIE CAMPOS MD      Narrative & Impression   CHEST TWO VIEWS 11/29/2024 11:01 AM      HISTORY: Acute sinusitis with symptoms > 10 days; Fever, unspecified  fever cause; Pituitary tumor; Type 2 diabetes mellitus with  hyperglycemia, unspecified whether long term insulin use (H)     COMPARISON: None.                                                                       IMPRESSION: Faint right midlung nodular density measuring 1.5 cm is  possibly small focal infectious/inflammatory process, although  pulmonary nodule is possible. Recommend further characterization with  CT chest.      No  pleural effusion or pneumothorax. The mediastinal silhouette is  unremarkable.     [Recommend Follow Up: Lung finding]     This report will be copied to the [facility] to ensure a  provider acknowledges the finding. Access Center is available Monday  through Friday 8am-3:30 pm.     ADAMS BRISCOE MD      Narrative & Impression  CT CHEST W/O CONTRAST 1/13/2025 9:30 AM     CLINICAL HISTORY: right lung nodule assessment for robotic  bronchoscopy plannng (ION protocol)     TECHNIQUE: CT chest without IV contrast. Multiplanar reformats were  obtained. Dose reduction techniques were used.  CONTRAST: None.     COMPARISON: 1/6/2025.     FINDINGS:   LUNGS AND PLEURA: Redemonstration of 1.3 x 1.9 pleural-based solid  nodule of the posterior lateral right lower lobe series 4 image 248.  Bibasilar atelectasis. No pleural effusion.     MEDIASTINUM/AXILLAE: Fluid attenuating debris within the esophagus.   Mediastinal and right hilar lymphadenopathy similar to previous. No  thoracic aortic aneurysm.     CORONARY ARTERY CALCIFICATION: None.     UPPER ABDOMEN: Small hiatal hernia.      MUSCULOSKELETAL: Moderate degeneration of the bilateral shoulders with  subchondral cystic formation. No concerning osseous lesion.                                                                      IMPRESSION:      1. Redemonstration of irregularly marginated pleural-based right lower  lobe nodule seen on series 4 image 248.  2. Mediastinal and right hilar lymphadenopathy.  3. Small hiatal hernia with debris within the esophagus.     I have personally reviewed the examination and initial interpretation  and I agree with the findings.     IVONNE MEYER MD         SYSTEM ID:  [id number]  Past Medical History  Patient Active Problem List   Diagnosis    Type 2 diabetes mellitus with obesity (H)    Sleep apnea    Dyslipidemia    Pituitary adenoma (H)     Current Medications     Current Outpatient Medications:     atorvastatin  (LIPITOR) 40 MG tablet, Take 1 tablet (40 mg) by mouth daily., Disp: 90 tablet, Rfl: 2    azelastine (ASTELIN) 0.1 % nasal spray, Spray 1 spray into both nostrils 2 times daily., Disp: , Rfl:     blood glucose (NO BRAND SPECIFIED) lancets standard, Use to test blood sugar 1 time daily or as directed., Disp: 100 Lancet, Rfl: 3    blood glucose (NO BRAND SPECIFIED) test strip, Use to test blood sugar once daily., Disp: 100 strip, Rfl: 3    Dapagliflozin Pro-metFORMIN ER (XIGDUO XR)  MG TB24, Take 1 tablet by mouth daily., Disp: 90 tablet, Rfl: 0    doxycycline hyclate (VIBRAMYCIN) 100 MG capsule, Take 1 capsule (100 mg) by mouth 2 times daily., Disp: 60 capsule, Rfl: 0    fenofibrate (TRIGLIDE/LOFIBRA) 160 MG tablet, Take 1 tablet (160 mg) by mouth daily., Disp: 90 tablet, Rfl: 3    fluticasone (FLONASE) 50 MCG/ACT nasal spray, Spray 1 spray into both nostrils daily., Disp: 16 g, Rfl: 1    halobetasol (ULTRAVATE) 0.05 % external cream, , Disp: , Rfl:     insulin glargine (LANTUS PEN) 100 UNIT/ML pen, Inject 20 Units subcutaneously every evening, Disp: 30 mL, Rfl: 1    insulin pen needle (31G X 5 MM) 31G X 5 MM miscellaneous, Use pen needles daily with current daily insulin regimen and Mounjaro., Disp: 300 each, Rfl: 2    JATENZO 237 MG CAPS, Take 237 mg by mouth 2 times daily., Disp: 180 capsule, Rfl: 1    losartan (COZAAR) 50 MG tablet, Take 1 tablet (50 mg) by mouth daily., Disp: 90 tablet, Rfl: 3    MOUNJARO 7.5 MG/0.5ML pen, Inject 7.5 mg subcutaneously once a week, Disp: 2 mL, Rfl: 11    NIFEdipine ER (ADALAT CC) 30 MG 24 hr tablet, Take 1 tablet (30 mg) by mouth daily., Disp: 90 tablet, Rfl: 3    pantoprazole (PROTONIX) 40 MG EC tablet, Take 1 tablet (40 mg) by mouth 2 times daily., Disp: 180 tablet, Rfl: 3    tadalafil (ADCIRCA/CIALIS) 20 MG tablet, Take 1 tablet (20 mg) by mouth every 24 hours., Disp: 90 tablet, Rfl: 3    traZODone (DESYREL) 50 MG tablet, Take 1 tablet (50 mg) by mouth every evening  "Take 1 hour before bedtime., Disp: 90 tablet, Rfl: 3    vitamin D3 (CHOLECALCIFEROL) 50 mcg (2000 units) tablet, , Disp: , Rfl:     Allergies  Allergies   Allergen Reactions    Ace Inhibitors Hives       Social History   Social History     Socioeconomic History    Marital status:    Tobacco Use    Smoking status: Never     Passive exposure: Never    Smokeless tobacco: Never   Substance and Sexual Activity    Alcohol use: Yes     Comment: maybe three beers a week    Drug use: Never    Sexual activity: Yes     Partners: Female     Birth control/protection: Male Surgical   Other Topics Concern    Parent/sibling w/ CABG, MI or angioplasty before 65F 55M? Yes     Comment: My father had bypass surgery in 2012     Social Drivers of Health     Interpersonal Safety: Low Risk  (1/9/2025)    Interpersonal Safety     Do you feel physically and emotionally safe where you currently live?: Yes     Within the past 12 months, have you been hit, slapped, kicked or otherwise physically hurt by someone?: No     Within the past 12 months, have you been humiliated or emotionally abused in other ways by your partner or ex-partner?: No       Family History  Family History   Problem Relation Age of Onset    Diabetes Father         Became diabetic after heart surgery    Hypertension Father     Breast Cancer Sister        Review of Systems  As per HPI and PMHx, otherwise 10+ comprehensive system review is negative.    Physical Exam  /66   Temp 97.5  F (36.4  C) (Temporal)   Ht 1.88 m (6' 2\")   Wt 109.4 kg (241 lb 2.9 oz)   BMI 30.97 kg/m    GENERAL: The patient is a pleasant, cooperative 54 year old male in no acute distress.  HEAD: Normocephalic, atraumatic. Hair and scalp are normal.  EYES: Pupils are equal, round, reactive to light and accommodation. Extraocular movements are intact. The sclera nonicteric without injection. The extraocular structures are normal.  EARS: Normal shape and symmetry. No tenderness when " palpating the mastoid or tragal areas bilaterally. No mastoid erythema or fluctuance. Otoscopic exam on the right reveals no amount of cerumen. The right tympanic membrane is round, intact without evidence of effusion, good landmarks.  No retraction, granulation, or drainage. Otoscopic exam on the left reveals no amount of cerumen. The left tympanic membrane is round, intact without evidence of effusion, good landmarks.  No retraction, granulation, or drainage.  NOSE: Nares are patent.  Nasal mucosa is pink.  ORAL CAVITY: Lips are normal. Dentition is in good repair. Mucous membranes are moist. Tongue is mobile, protrudes to the midline.  Palate elevates symmetrically. Tonsils are +1. No erythema or exudate. No oral cavity or oropharyngeal masses, lesions, ulcerations, or leukoplakia.  NECK: Supple, trachea is midline. There is no palpable cervical lymphadenopathy or masses bilaterally. Palpation of the bilateral parotid and submandibular areas reveal no masses. No thyromegaly.    NEUROLOGIC: Cranial nerves II through XII are grossly intact. Voice is strong. Patient is House-Brackmann I/VI bilaterally.  CARDIOVASCULAR: Extremities are warm and well-perfused. No significant peripheral edema.  RESPIRATORY: Patient has nonlabored breathing without cough, wheeze, stridor.  PSYCHIATRIC: Patient is alert and oriented. Mood and affect appear normal.  SKIN: Warm and dry. No scalp, face, or neck lesions noted.    Procedure: Flexible Nasal Endoscopy  Indication: Chronic nose and sinus symptoms    To best visualize the sinonasal anatomy and due to the chief complaint and HPI, I proceeded with flexible fiberoptic nasal endoscopy.  The bilateral nasal cavities were anesthetized and decongested. The bilateralnasal cavities were then examined using flexible fiberoptic nasal endoscope. The right nasal cavity was without masses, polyps, or mucopurulence. The right middle turbinate and middle meatus was normal in appearance. The  sphenoethmoid recess, inferior meatus, superior meatus, and frontal sinus outflow areas were clear. The left nasal cavity was without masses, polyps, or mucopurulence. The left middle turbinate and middle meatus was normal in appearance. The sphenoethmoid recess, inferior meatus, superior meatus, and frontal sinus outflow areas were clear. The sinonasalmucosa appeared normal. The nasal septum straight. The inferior turbinates were  hypertrophied. The nasopharynx had a normal appearance with normal Eustachian tube openings and fossa of Rosenmuller bilaterally. Normal adenoid tissue. The scope was removed. The patient tolerated the procedure well.    Assessment and Plan     ICD-10-CM    1. Sinus mucosal thickening  J34.89       2. Chronic sinusitis, unspecified location  J32.9 Nasal Endoscopy, Diag     Adult ENT  Referral      3. History of nasal septoplasty  Z98.890         It was my pleasure seeing Sundeep Fernandez today in clinic. Based on patient history and physical examination, the patient most likely had a sinus infection back in November, which has cleared. Currently, from an ENT standpoint he does not have any signs of infection. Due to the mucosal thickening mentioned in the right maxillary sinus will start the patient on a nasal saline rinse twice per day.     Due to his surgical history, I referred him to  to assess his nasal obstruction. The patient was scheduled for next week.       EFREN Potts [facility]  Otolaryngology  [facility]

## 2025-01-13 NOTE — DISCHARGE INSTRUCTIONS
Post Bronchoscopy Patient Instructions:    January 13, 2025  Sundeep Fernandez    Your procedure completed (bronchoscopy with lavage and lymph node biopsies) without any immediate complications.  You may cough up scant amount of blood for the next 12-24 hours. If you have excessive cough with blood, chest pain, shortness of breath, please report to the closest emergency room.    You may experience low grade (less than 100.5 F) fever next 24 hours, if so, you can take Tylenol. If the fever persists more than 24 hours, please contact to our office or your primary care provider.    Our office will call you with the results of samples taken during the procedure. Please note that you may get a result notification through  My Chart  before us calling you, as the Laboratories are instructed to release the results as soon as they are available to the patients and providers at the same time. Please allow your us 24-48 hours call you to discuss the results.    You may resume your diet as it was prior to procedure.    You may resume your medications after the procedure unless you are instructed to do differently.     Please follow instructions from the nursing staff upon discharge in terms of activity. In general, you should avoid any attention or motor skill requiring activities (e.g., driving or operating any motorized vehicle) for 24 hours as you might be still under the effect of sedation medications. Please make sure an adult to accompany you next 24 hours.     Should you have any question, please do not hesitate to call our office [facility]en [phone].     Please take a few moments to evaluate the care you received by me on this link: [email or web address]  Interventional Pulmonary Fellow        Contacting your Doctor -   To contact a doctor, call June Ocasio MD   PulmonologyOffice: [phone]  or:  [phone] and ask for the resident on call for pulmonology (answered 57  hours a day)   Emergency Department:  [facility] [place]: [phone]  [facility]: [phone] 911 if you are in need of immediate or emergent help

## 2025-01-13 NOTE — ANESTHESIA CARE TRANSFER NOTE
Patient: Sundeep Fernandez    Procedure: Procedure(s):  BRONCHOSCOPY, WITH BIOPSY OF Three LYMPH NODE STATIONS WITH ENDOBRONCHIAL ULTRASOUND GUIDANCE       Diagnosis: Right lower lobe lung mass [R91.8]  Diagnosis Additional Information: No value filed.    Anesthesia Type:   General     Note:    Oropharynx: oropharynx clear of all foreign objects and spontaneously breathing  Level of Consciousness: awake  Oxygen Supplementation: face mask  Level of Supplemental Oxygen (L/min / FiO2): 8  Independent Airway: airway patency satisfactory and stable  Dentition: dentition unchanged  Vital Signs Stable: post-procedure vital signs reviewed and stable  Report to RN Given: handoff report given  Patient transferred to: PACU    Handoff Report: Identifed the Patient, Identified the Reponsible Provider, Reviewed the pertinent medical history, Discussed the surgical course, Reviewed Intra-OP anesthesia mangement and issues during anesthesia, Set expectations for post-procedure period and Allowed opportunity for questions and acknowledgement of understanding      Vitals:  Vitals Value Taken Time   /75 01/13/25 1237   Temp     Pulse 85 01/13/25 1240   Resp 14 01/13/25 1240   SpO2 93 % 01/13/25 1240   Vitals shown include unfiled device data.    Electronically Signed By: EFREN Casillas CRNA  January 13, 2025  12:41 PM

## 2025-01-14 ENCOUNTER — OFFICE VISIT (OUTPATIENT)
Dept: OTOLARYNGOLOGY | Facility: CLINIC | Age: 55
End: 2025-01-14
Payer: COMMERCIAL

## 2025-01-14 VITALS
HEIGHT: 74 IN | DIASTOLIC BLOOD PRESSURE: 66 MMHG | TEMPERATURE: 97.5 F | SYSTOLIC BLOOD PRESSURE: 136 MMHG | BODY MASS INDEX: 30.95 KG/M2 | WEIGHT: 241.18 LBS

## 2025-01-14 DIAGNOSIS — Z98.890 HISTORY OF NASAL SEPTOPLASTY: ICD-10-CM

## 2025-01-14 DIAGNOSIS — J34.89 SINUS MUCOSAL THICKENING: Primary | ICD-10-CM

## 2025-01-14 DIAGNOSIS — J32.9 CHRONIC SINUSITIS, UNSPECIFIED LOCATION: ICD-10-CM

## 2025-01-14 LAB
PATH REPORT.COMMENTS IMP SPEC: NORMAL
PATH REPORT.FINAL DX SPEC: NORMAL
PATH REPORT.GROSS SPEC: NORMAL
PATH REPORT.MICROSCOPIC SPEC OTHER STN: NORMAL
PATH REPORT.RELEVANT HX SPEC: NORMAL

## 2025-01-14 PROCEDURE — 31231 NASAL ENDOSCOPY DX: CPT

## 2025-01-14 PROCEDURE — 99202 OFFICE O/P NEW SF 15 MIN: CPT | Mod: 25

## 2025-01-14 NOTE — LETTER
1/14/2025      Sundeep Fernandez  [address] [facility] [place] [address]      Dear Colleague,    Thank you for referring your patient, Sundeep Fernandez, to the [facility]. Please see a copy of my visit note below.    Chief Complaint   Patient presents with     Consult     Sinus pressure, fatigue x 3 months, polyps     History of Present Illness   Sundeep Fernandez is a 54 year old male who presents for evaluation hypertension, type 2 diabetes, hyperlipidemia, and history of pituitary adenoma . Referred by EFREN Granda. The second week of October 2024 the patient developed bad sinus infections. The patient was seen on 11/18/24 for sinus concerns. It was noted that the patient recently completed 7 day course of Doxycyline BID for one week. Patient was prescribed 5 day course of prednisone to further reduce inflammation. Then he was seen in  due to high fevers/congestion/HA/fatigue and a chest xray was obtained. It was noted that there was a mass on his lung.  The prednisone did not work or the nasal spray. Then around Thanksgiving, he went back on Doxycyline for 10 days. Then off for a week, then a week prior to [place] he started a 30 day Doxycyline and will finish this coming Friday (1/17/25). He is being worked up for his lungs.     The patient describes symptoms of sinus pressure/HA  for the past 4-5 months. The patient notes no history of environmental allergies. They have  been tested for allergies in the past. Treatments have included nasal irrigations, nasal steroids, antibiotics and oral antihistamines. The treatments seem to not work. However, being on Doxycyline has helped. No history of nasal trauma. The patient reports nasal obstruction left greater than right (changes between sides), nasal congestion, only rhinorrhea when eating, no post nasal drainage, no taste/smell disturbance, face pain/pressure/fullness. No history of epistaxis. No prior history of nose or sinus  surgery. No history of smoking. Acute history of migraine headache. No history of asthma. No history of aspirin/NSAID sensitivity.    The patient describes developing HA. He rates the pain between 1-5/10. Symptoms are worse in the AM. Denies any head pressure like he is going to faint or is dizzy when bending over. Denies dizziness.     HX of septoplasty surgery 2010 that was unsuccessful. There was a hole left in his septum.   Narrative & Impression   EXAM: CT SINUS W/O CONTRAST  11/26/2024 4:14 PM      HISTORY:  Chronic sinusitis, unspecified location        COMPARISON:  None available     TECHNIQUE:  Using thin collimation multidetector helical acquisition  technique, axial, coronal, and sagittal thin section CT images were  reconstructed through the paranasal sinuses. Images were reviewed in  bone and soft tissue windows.     FINDINGS:   Maxillary sinuses: Mild polypoid mucosal thickening of the right  maxillary sinus. The left maxillary sinus is clear.  Sphenoid sinus: clear.  Frontal sinus: clear.  Ethmoid air cells: clear.     Patent ostiomeatal units. Intact walls of the paranasal sinuses. Clear  retromaxillary and pterygopalatine fat. Adenoid tonsils are normal. No  periapical lucency.     The imaged skull base, intracranial and orbital structures are within  normal limits                                                                      IMPRESSION:   1. No CT evidence of acute sinusitis.  2. Probable mucous retention cysts in the right maxillary sinus.     I have personally reviewed the examination and initial interpretation  and I agree with the findings.     CHRISSIE CAMPOS MD      Narrative & Impression   CHEST TWO VIEWS 11/29/2024 11:01 AM      HISTORY: Acute sinusitis with symptoms > 10 days; Fever, unspecified  fever cause; Pituitary tumor; Type 2 diabetes mellitus with  hyperglycemia, unspecified whether long term insulin use (H)     COMPARISON: None.                                                                        IMPRESSION: Faint right midlung nodular density measuring 1.5 cm is  possibly small focal infectious/inflammatory process, although  pulmonary nodule is possible. Recommend further characterization with  CT chest.      No pleural effusion or pneumothorax. The mediastinal silhouette is  unremarkable.     [Recommend Follow Up: Lung finding]     This report will be copied to the [facility] to ensure a  provider acknowledges the finding. Access Center is available Monday  through Friday 8am-3:30 pm.     ADAMS BRISCOE MD      Narrative & Impression  CT CHEST W/O CONTRAST 1/13/2025 9:30 AM     CLINICAL HISTORY: right lung nodule assessment for robotic  bronchoscopy plannng (ION protocol)     TECHNIQUE: CT chest without IV contrast. Multiplanar reformats were  obtained. Dose reduction techniques were used.  CONTRAST: None.     COMPARISON: 1/6/2025.     FINDINGS:   LUNGS AND PLEURA: Redemonstration of 1.3 x 1.9 pleural-based solid  nodule of the posterior lateral right lower lobe series 4 image 248.  Bibasilar atelectasis. No pleural effusion.     MEDIASTINUM/AXILLAE: Fluid attenuating debris within the esophagus.   Mediastinal and right hilar lymphadenopathy similar to previous. No  thoracic aortic aneurysm.     CORONARY ARTERY CALCIFICATION: None.     UPPER ABDOMEN: Small hiatal hernia.      MUSCULOSKELETAL: Moderate degeneration of the bilateral shoulders with  subchondral cystic formation. No concerning osseous lesion.                                                                      IMPRESSION:      1. Redemonstration of irregularly marginated pleural-based right lower  lobe nodule seen on series 4 image 248.  2. Mediastinal and right hilar lymphadenopathy.  3. Small hiatal hernia with debris within the esophagus.     I have personally reviewed the examination and initial interpretation  and I agree with the findings.     IVONNE MEYER MD         SYSTEM ID:   [id number]  Past Medical History  Patient Active Problem List   Diagnosis     Type 2 diabetes mellitus with obesity (H)     Sleep apnea     Dyslipidemia     Pituitary adenoma (H)     Current Medications     Current Outpatient Medications:      atorvastatin (LIPITOR) 40 MG tablet, Take 1 tablet (40 mg) by mouth daily., Disp: 90 tablet, Rfl: 2     azelastine (ASTELIN) 0.1 % nasal spray, Spray 1 spray into both nostrils 2 times daily., Disp: , Rfl:      blood glucose (NO BRAND SPECIFIED) lancets standard, Use to test blood sugar 1 time daily or as directed., Disp: 100 Lancet, Rfl: 3     blood glucose (NO BRAND SPECIFIED) test strip, Use to test blood sugar once daily., Disp: 100 strip, Rfl: 3     Dapagliflozin Pro-metFORMIN ER (XIGDUO XR)  MG TB24, Take 1 tablet by mouth daily., Disp: 90 tablet, Rfl: 0     doxycycline hyclate (VIBRAMYCIN) 100 MG capsule, Take 1 capsule (100 mg) by mouth 2 times daily., Disp: 60 capsule, Rfl: 0     fenofibrate (TRIGLIDE/LOFIBRA) 160 MG tablet, Take 1 tablet (160 mg) by mouth daily., Disp: 90 tablet, Rfl: 3     fluticasone (FLONASE) 50 MCG/ACT nasal spray, Spray 1 spray into both nostrils daily., Disp: 16 g, Rfl: 1     halobetasol (ULTRAVATE) 0.05 % external cream, , Disp: , Rfl:      insulin glargine (LANTUS PEN) 100 UNIT/ML pen, Inject 20 Units subcutaneously every evening, Disp: 30 mL, Rfl: 1     insulin pen needle (31G X 5 MM) 31G X 5 MM miscellaneous, Use pen needles daily with current daily insulin regimen and Mounjaro., Disp: 300 each, Rfl: 2     JATENZO 237 MG CAPS, Take 237 mg by mouth 2 times daily., Disp: 180 capsule, Rfl: 1     losartan (COZAAR) 50 MG tablet, Take 1 tablet (50 mg) by mouth daily., Disp: 90 tablet, Rfl: 3     MOUNJARO 7.5 MG/0.5ML pen, Inject 7.5 mg subcutaneously once a week, Disp: 2 mL, Rfl: 11     NIFEdipine ER (ADALAT CC) 30 MG 24 hr tablet, Take 1 tablet (30 mg) by mouth daily., Disp: 90 tablet, Rfl: 3     pantoprazole (PROTONIX) 40 MG EC tablet,  "Take 1 tablet (40 mg) by mouth 2 times daily., Disp: 180 tablet, Rfl: 3     tadalafil (ADCIRCA/CIALIS) 20 MG tablet, Take 1 tablet (20 mg) by mouth every 24 hours., Disp: 90 tablet, Rfl: 3     traZODone (DESYREL) 50 MG tablet, Take 1 tablet (50 mg) by mouth every evening Take 1 hour before bedtime., Disp: 90 tablet, Rfl: 3     vitamin D3 (CHOLECALCIFEROL) 50 mcg (2000 units) tablet, , Disp: , Rfl:     Allergies  Allergies   Allergen Reactions     Ace Inhibitors Hives       Social History   Social History     Socioeconomic History     Marital status:    Tobacco Use     Smoking status: Never     Passive exposure: Never     Smokeless tobacco: Never   Substance and Sexual Activity     Alcohol use: Yes     Comment: maybe three beers a week     Drug use: Never     Sexual activity: Yes     Partners: Female     Birth control/protection: Male Surgical   Other Topics Concern     Parent/sibling w/ CABG, MI or angioplasty before 65F 55M? Yes     Comment: My father had bypass surgery in 2012     Social Drivers of Health     Interpersonal Safety: Low Risk  (1/9/2025)    Interpersonal Safety      Do you feel physically and emotionally safe where you currently live?: Yes      Within the past 12 months, have you been hit, slapped, kicked or otherwise physically hurt by someone?: No      Within the past 12 months, have you been humiliated or emotionally abused in other ways by your partner or ex-partner?: No       Family History  Family History   Problem Relation Age of Onset     Diabetes Father         Became diabetic after heart surgery     Hypertension Father      Breast Cancer Sister        Review of Systems  As per HPI and PMHx, otherwise 10+ comprehensive system review is negative.    Physical Exam  /66   Temp 97.5  F (36.4  C) (Temporal)   Ht 1.88 m (6' 2\")   Wt 109.4 kg (241 lb 2.9 oz)   BMI 30.97 kg/m    GENERAL: The patient is a pleasant, cooperative 54 year old male in no acute distress.  HEAD: " Normocephalic, atraumatic. Hair and scalp are normal.  EYES: Pupils are equal, round, reactive to light and accommodation. Extraocular movements are intact. The sclera nonicteric without injection. The extraocular structures are normal.  EARS: Normal shape and symmetry. No tenderness when palpating the mastoid or tragal areas bilaterally. No mastoid erythema or fluctuance. Otoscopic exam on the right reveals no amount of cerumen. The right tympanic membrane is round, intact without evidence of effusion, good landmarks.  No retraction, granulation, or drainage. Otoscopic exam on the left reveals no amount of cerumen. The left tympanic membrane is round, intact without evidence of effusion, good landmarks.  No retraction, granulation, or drainage.  NOSE: Nares are patent.  Nasal mucosa is pink.  ORAL CAVITY: Lips are normal. Dentition is in good repair. Mucous membranes are moist. Tongue is mobile, protrudes to the midline.  Palate elevates symmetrically. Tonsils are +1. No erythema or exudate. No oral cavity or oropharyngeal masses, lesions, ulcerations, or leukoplakia.  NECK: Supple, trachea is midline. There is no palpable cervical lymphadenopathy or masses bilaterally. Palpation of the bilateral parotid and submandibular areas reveal no masses. No thyromegaly.    NEUROLOGIC: Cranial nerves II through XII are grossly intact. Voice is strong. Patient is House-Brackmann I/VI bilaterally.  CARDIOVASCULAR: Extremities are warm and well-perfused. No significant peripheral edema.  RESPIRATORY: Patient has nonlabored breathing without cough, wheeze, stridor.  PSYCHIATRIC: Patient is alert and oriented. Mood and affect appear normal.  SKIN: Warm and dry. No scalp, face, or neck lesions noted.    Procedure: Flexible Nasal Endoscopy  Indication: Chronic nose and sinus symptoms    To best visualize the sinonasal anatomy and due to the chief complaint and HPI, I proceeded with flexible fiberoptic nasal endoscopy.  The  bilateral nasal cavities were anesthetized and decongested. The bilateralnasal cavities were then examined using flexible fiberoptic nasal endoscope. The right nasal cavity was without masses, polyps, or mucopurulence. The right middle turbinate and middle meatus was normal in appearance. The sphenoethmoid recess, inferior meatus, superior meatus, and frontal sinus outflow areas were clear. The left nasal cavity was without masses, polyps, or mucopurulence. The left middle turbinate and middle meatus was normal in appearance. The sphenoethmoid recess, inferior meatus, superior meatus, and frontal sinus outflow areas were clear. The sinonasalmucosa appeared normal. The nasal septum straight. The inferior turbinates were  hypertrophied. The nasopharynx had a normal appearance with normal Eustachian tube openings and fossa of Rosenmuller bilaterally. Normal adenoid tissue. The scope was removed. The patient tolerated the procedure well.    Assessment and Plan     ICD-10-CM    1. Sinus mucosal thickening  J34.89       2. Chronic sinusitis, unspecified location  J32.9 Nasal Endoscopy, [facility]     Adult ENT  Referral      3. History of nasal septoplasty  Z98.890         It was my pleasure seeing Sundeep Fernandez today in clinic. Based on patient history and physical examination, the patient most likely had a sinus infection back in November, which has cleared. Currently, from an ENT standpoint he does not have any signs of infection. Due to the mucosal thickening mentioned in the right maxillary sinus will start the patient on a nasal saline rinse twice per day.     Due to his surgical history, I referred him to  to assess his nasal obstruction. The patient was scheduled for next week.       EFREN Potts CNP  Otolaryngology  [place] [facility] [place]      Again, thank you for allowing me to participate in the care of your patient.        Sincerely,        EFREN Potts CNP    Electronically signed

## 2025-01-14 NOTE — PATIENT INSTRUCTIONS
You were seen by Haseeb Hernandez CNP.  If you have questions or concerns regarding your appointment today, you can reach out to our call center at [phone].  The following has been recommended at your appointment today:    Start using the nasal sinus rinse twice daily.   I will have the team call  you to schedule an appointment with an ENT doctor.     NASAL SALINE IRRIGATION INSTRUCTIONS    You will be starting nasal saline irrigations and will need to obtain the following:      - NeilMed Sinus Rinse 8 oz Kit  - Distilled or filtered water   - Normal saline salt packets    Place filtered or distilled water into the NeilMed bottle up to the fill line (DO NOT USE TAP OR WELL WATER).  Place the pre-made salt packet in the 8 oz of saline.  Shake the bottle to suspend into solution.  Lean head forward over a sink or a basin.  Rinse each side of the nose with one-half of the bottle (each squeeze is about one-half of the bottle). Rinse the nose daily.     If you use topical nasal sprays, apply following irrigation.    Video example: https://[email or web address] [facility].com/[email or web address]?v=[email or web address]

## 2025-01-15 ENCOUNTER — CARE COORDINATION (OUTPATIENT)
Dept: PULMONOLOGY | Facility: CLINIC | Age: 55
End: 2025-01-15
Payer: COMMERCIAL

## 2025-01-15 LAB
ACID FAST STAIN (ARUP): NORMAL
BACTERIA BRONCH: NO GROWTH
H CAPSUL AG UR QL IA: NOT DETECTED
H CAPSUL AG UR-MCNC: NOT DETECTED NG/ML
PATH REPORT.COMMENTS IMP SPEC: ABNORMAL
PATH REPORT.COMMENTS IMP SPEC: YES
PATH REPORT.FINAL DX SPEC: ABNORMAL
PATH REPORT.GROSS SPEC: ABNORMAL
PATH REPORT.MICROSCOPIC SPEC OTHER STN: ABNORMAL
PATH REPORT.RELEVANT HX SPEC: ABNORMAL

## 2025-01-15 NOTE — PROGRESS NOTES
Path forwarded to Cony Padilla PA-C. Requesting she update patient on results/next steps.         Fine Needle Aspirate Lymph Node(s), Subcarinal, Right: [id number]  Order: [id number]  Status: Final result       Visible to patient: Yes (not seen)    0 Result Notes      Component  Ref Range & Units    Final Diagnosis   A. LYMPH NODE(S), SUBCARINAL, RIGHT, STATION 7, FINE NEEDLE ASPIRATE:  Interpretation -  - Non-necrotizing granulomatous lymphadenitis  - GMS and AFB stains do not show evidence of fungal or acid-fast organisms  - Negative for epithelial malignancy     Adequacy: Satisfactory for evaluation     B. LYMPH NODE(S), PARATRACHEAL, RIGHT, STATION 4R, FINE NEEDLE ASPIRATE:  Interpretation -  - Non-necrotizing granulomatous lymphadenitis  - GMS and AFB stains do not show evidence of fungal or acid-fast organisms  - Negative for epithelial malignancy     Adequacy: Satisfactory for evaluation     C. LYMPH NODE(S), HILAR, RIGHT, STATION 11R, FINE NEEDLE ASPIRATE:  Interpretation -   - Nondiagnostic specimen     Adequacy: Unsatisfactory for evaluation due to absence of lymphoid elements

## 2025-01-16 ENCOUNTER — TELEPHONE (OUTPATIENT)
Dept: PULMONOLOGY | Facility: CLINIC | Age: 55
End: 2025-01-16
Payer: COMMERCIAL

## 2025-01-16 DIAGNOSIS — R91.8 PULMONARY NODULES: Primary | ICD-10-CM

## 2025-01-16 LAB
BACTERIA BRONCH: NORMAL
BACTERIA FLD CULT: ABNORMAL
BACTERIA FLD CULT: ABNORMAL
BACTERIA FLD CULT: NORMAL

## 2025-01-16 NOTE — CONFIDENTIAL NOTE
01/16 Called patient (1st attempt) left voicemail and provided [phone] for patient to call back and schedule repeat CT scan in 3 months, and follow up visit with Cony Padilla PA-C to review.       [facility]     Surgical Specialties   [facility]

## 2025-01-16 NOTE — RESULT ENCOUNTER NOTE
Hi, I ordered a CT chest 3 months, can you assist with arranging follow up with me after? Thanks!  Cony Padilla PA-C

## 2025-01-16 NOTE — TELEPHONE ENCOUNTER
----- Message from Cony Padilla sent at 1/16/2025 10:56 AM CST -----  Hi, I ordered a CT chest 3 months, can you assist with arranging follow up with me after? Thanks!  Cony Padilla PA-C

## 2025-01-16 NOTE — PROGRESS NOTES
"ENT Consultation    Sundeep Fernandez who is a 54 year old male seen in consultation at the request of Haseeb Hernandez CNP.      History of Present Illness - Sundeep Fernandez is a 54 year old male ***      There is no height or weight on file to calculate BMI.    {Weight Management Plan -- Delete if patient has a normal BMI:502559}    BP Readings from Last 1 Encounters:   25 136/66       {htnspecialty:241947}    Sundeep {IS:341341} a smoker/uses chewing tobacco.  Sundeep {QUITTIN::\"is not ready to quit\"}      Past Medical History -   Past Medical History:   Diagnosis Date    Diabetes (H) 2014    Hypertension 2008       Current Medications -   Current Outpatient Medications:     atorvastatin (LIPITOR) 40 MG tablet, Take 1 tablet (40 mg) by mouth daily., Disp: 90 tablet, Rfl: 2    azelastine (ASTELIN) 0.1 % nasal spray, Spray 1 spray into both nostrils 2 times daily., Disp: , Rfl:     blood glucose (NO BRAND SPECIFIED) lancets standard, Use to test blood sugar 1 time daily or as directed., Disp: 100 Lancet, Rfl: 3    blood glucose (NO BRAND SPECIFIED) test strip, Use to test blood sugar once daily., Disp: 100 strip, Rfl: 3    Dapagliflozin Pro-metFORMIN ER (XIGDUO XR)  MG TB24, Take 1 tablet by mouth daily., Disp: 90 tablet, Rfl: 0    doxycycline hyclate (VIBRAMYCIN) 100 MG capsule, Take 1 capsule (100 mg) by mouth 2 times daily., Disp: 60 capsule, Rfl: 0    fenofibrate (TRIGLIDE/LOFIBRA) 160 MG tablet, Take 1 tablet (160 mg) by mouth daily., Disp: 90 tablet, Rfl: 3    fluticasone (FLONASE) 50 MCG/ACT nasal spray, Spray 1 spray into both nostrils daily., Disp: 16 g, Rfl: 1    halobetasol (ULTRAVATE) 0.05 % external cream, , Disp: , Rfl:     insulin glargine (LANTUS PEN) 100 UNIT/ML pen, Inject 20 Units subcutaneously every evening, Disp: 30 mL, Rfl: 1    insulin pen needle (31G X 5 MM) 31G X 5 MM miscellaneous, Use pen needles daily with current daily insulin regimen and Mounjaro., Disp: 300 " each, Rfl: 2    JATENZO 237 MG CAPS, Take 237 mg by mouth 2 times daily., Disp: 180 capsule, Rfl: 1    losartan (COZAAR) 50 MG tablet, Take 1 tablet (50 mg) by mouth daily., Disp: 90 tablet, Rfl: 3    MOUNJARO 7.5 MG/0.5ML pen, Inject 7.5 mg subcutaneously once a week, Disp: 2 mL, Rfl: 11    NIFEdipine ER (ADALAT CC) 30 MG 24 hr tablet, Take 1 tablet (30 mg) by mouth daily., Disp: 90 tablet, Rfl: 3    pantoprazole (PROTONIX) 40 MG EC tablet, Take 1 tablet (40 mg) by mouth 2 times daily., Disp: 180 tablet, Rfl: 3    tadalafil (ADCIRCA/CIALIS) 20 MG tablet, Take 1 tablet (20 mg) by mouth every 24 hours., Disp: 90 tablet, Rfl: 3    traZODone (DESYREL) 50 MG tablet, Take 1 tablet (50 mg) by mouth every evening Take 1 hour before bedtime., Disp: 90 tablet, Rfl: 3    vitamin D3 (CHOLECALCIFEROL) 50 mcg (2000 units) tablet, , Disp: , Rfl:     Allergies -   Allergies   Allergen Reactions    Ace Inhibitors Hives       Social History -   Social History     Socioeconomic History    Marital status:    Tobacco Use    Smoking status: Never     Passive exposure: Never    Smokeless tobacco: Never   Substance and Sexual Activity    Alcohol use: Yes     Comment: maybe three beers a week    Drug use: Never    Sexual activity: Yes     Partners: Female     Birth control/protection: Male Surgical   Other Topics Concern    Parent/sibling w/ CABG, MI or angioplasty before 65F 55M? Yes     Comment: My father had bypass surgery in 2012     Social Drivers of Health     Interpersonal Safety: Low Risk  (1/13/2025)    Interpersonal Safety     Do you feel physically and emotionally safe where you currently live?: Yes     Within the past 12 months, have you been hit, slapped, kicked or otherwise physically hurt by someone?: No     Within the past 12 months, have you been humiliated or emotionally abused in other ways by your partner or ex-partner?: No       Family History -   Family History   Problem Relation Age of Onset    Diabetes  Father         Became diabetic after heart surgery    Hypertension Father     Breast Cancer Sister        Review of Systems - As per HPI and PMHx, otherwise review of system review of the head and neck negative. Otherwise 10+ review of system is negative    Physical Exam  There were no vitals taken for this visit.  BMI: There is no height or weight on file to calculate BMI.    General - The patient is well nourished and well developed, and appears to have good nutritional status.  Alert and oriented to person and place, answers questions and cooperates with examination appropriately.    SKIN - No suspicious lesions or rashes.  Respiration - No respiratory distress.  Head and Face - Normocephalic and atraumatic, with no gross asymmetry noted of the contour of the facial features.  The facial nerve is intact, with strong symmetric movements.    Voice and Breathing - The patient was breathing comfortably without the use of accessory muscles. The patients voice was clear and strong, and had appropriate pitch and quality.    Ears - Bilateral pinna and EACs with normal appearing overlying skin. Tympanic membrane intact with good mobility on pneumatic otoscopy bilaterally. Bony landmarks of the ossicular chain are normal. The tympanic membranes are normal in appearance. No retraction, perforation, or masses.  No fluid or purulence was seen in the external canal or the middle ear.     Eyes - Extraocular movements intact.  Sclera were not icteric or injected, conjunctiva were pink and moist.    Mouth - Examination of the oral cavity showed pink, healthy oral mucosa. No lesions or ulcerations noted.  The tongue was mobile and midline, and the dentition were in good condition.      Throat - The walls of the oropharynx were smooth, pink, moist, symmetric, and had no lesions or ulcerations.  The tonsillar pillars and soft palate were symmetric.  The uvula was midline on elevation.    Neck - Normal midline excursion of the  laryngotracheal complex during swallowing.  Full range of motion on passive movement.  Palpation of the occipital, submental, submandibular, internal jugular chain, and supraclavicular nodes did not demonstrate any abnormal lymph nodes or masses.  The carotid pulse was palpable bilaterally.  Palpation of the thyroid was soft and smooth, with no nodules or goiter appreciated.  The trachea was mobile and midline.    Nose - External contour is symmetric, no gross deflection or scars.  Nasal mucosa is pink and moist with no abnormal mucus.  The septum was midline and non-obstructive, turbinates of normal size and position.  No polyps, masses, or purulence noted on examination.    Neuro - Nonfocal neuro exam is normal, CN 2 through 12 intact, normal gait and muscle tone.      Performed in clinic today:  [facility]      A/P - Sundeep MACKENZIE Fernandez is a 54 year old male ***      Luis Flanagan MD    the thyroid was soft and smooth, with no nodules or goiter appreciated.  The trachea was mobile and midline.    Nose - External contour is symmetric, no gross deflection or scars.  Nasal mucosa is pink and moist with some dry mucus around the perforation anteriorly.  The septum was still somewhat deviated posterior to the perforation to the right and somewhat obstructive, turbinates of enlarged size and position.  No polyps, masses, or purulence noted on examination.  About 3 x 4 mm anterior perforation was appreciated in the septum.  Neuro - Nonfocal neuro exam is normal, CN 2 through 12 intact, normal gait and muscle tone.      Performed in clinic today:  After decongestion with Ramírez-Synephrine patient could breathe a little bit better he had had more of a nasal valve collapse on the left side improved with [place] maneuver.      A/P - Sundeep Fernandez is a 54 year old male with nasal obstruction congestion partly due to scabbing from nasal perforation partly due to some persistence of turbinate enlargement as well as a little bit of deviated septum posterior to the perforation.  At this point we discussed potentially addressing this with the repair of septal perforation possibly revision of septoplasty posteriorly even though quite risky with the existing perforation and reduction of turbinates and even considering the nasal valve area.  Due to the perforation will send the patient to see Dr. Wojciech Linton, ENT facial plastics reconstructive surgeon in [place] for further evaluation.  In the meantime patient is counseled to continue using topical Vaseline on the septum on both sides to prevent further scabbing and nosebleeds.  40 minutes with the patient today in the reviewing the information previously presented counseling and exam.      Luis Flanagan MD

## 2025-01-16 NOTE — CONFIDENTIAL NOTE
Pulmonary Telephone note  Discussed results of EBUS and CT scan with the patient. Findings are consistent with resolving infection/inflammation. Cultures NGTD. Repeat CT chest and follow up 3 months to document improvement/resolution in nodularity.     Cony Padilla PA-C  Interventional and General Pulmonology  Department of Pulmonary, Allergy, Critical Care and Sleep Medicine   [facility]

## 2025-01-18 LAB — BACTERIA FLD CULT: ABNORMAL

## 2025-01-22 ENCOUNTER — OFFICE VISIT (OUTPATIENT)
Dept: OTOLARYNGOLOGY | Facility: OTHER | Age: 55
End: 2025-01-22
Payer: COMMERCIAL

## 2025-01-22 VITALS
SYSTOLIC BLOOD PRESSURE: 116 MMHG | TEMPERATURE: 98.7 F | DIASTOLIC BLOOD PRESSURE: 62 MMHG | HEIGHT: 74 IN | WEIGHT: 243 LBS | BODY MASS INDEX: 31.18 KG/M2

## 2025-01-22 DIAGNOSIS — J34.2 DEVIATED NASAL SEPTUM: ICD-10-CM

## 2025-01-22 DIAGNOSIS — J34.89 NASAL SEPTAL PERFORATION: Primary | ICD-10-CM

## 2025-01-22 DIAGNOSIS — J34.89 NASAL OBSTRUCTION: ICD-10-CM

## 2025-01-22 DIAGNOSIS — J34.3 HYPERTROPHY OF BOTH INFERIOR NASAL TURBINATES: ICD-10-CM

## 2025-01-22 PROCEDURE — 99214 OFFICE O/P EST MOD 30 MIN: CPT | Performed by: OTOLARYNGOLOGY

## 2025-01-22 ASSESSMENT — PAIN SCALES - GENERAL: PAINLEVEL_OUTOF10: NO PAIN (0)

## 2025-01-22 NOTE — LETTER
1/22/2025      Sundeep Fernandez  [address] [place] [address]      Dear Colleague,    Thank you for referring your patient, Sundeep Fernandez, to the [facility]. Please see a copy of my visit note below.    ENT Consultation    Sundeep Fernandez who is a 54 year old male seen in consultation at the request of Haseeb Hernandez CNP.      History of Present Illness - Sundeep Fernandez is a 54 year old male presents for evaluation mainly of nasal obstruction congestion scabbing.  He was fighting episode of acute sinusitis was on doxycycline and is better now with respect to that.  Usually does not get a lot of recurrence of sinusitis.  Recent CT scan still showed little bit of residual disease in the maxillary sinus on the right but no other pathology.    EXAM: CT SINUS W/O CONTRAST  11/26/2024 4:14 PM      HISTORY:  Chronic sinusitis, unspecified location        COMPARISON:  None available     TECHNIQUE:  Using thin collimation multidetector helical acquisition  technique, axial, coronal, and sagittal thin section CT images were  reconstructed through the paranasal sinuses. Images were reviewed in  bone and soft tissue windows.     FINDINGS:   Maxillary sinuses: Mild polypoid mucosal thickening of the right  maxillary sinus. The left maxillary sinus is clear.  Sphenoid sinus: clear.  Frontal sinus: clear.  Ethmoid air cells: clear.     Patent ostiomeatal units. Intact walls of the paranasal sinuses. Clear  retromaxillary and pterygopalatine fat. Adenoid tonsils are normal. No  periapical lucency.     The imaged skull base, intracranial and orbital structures are within  normal limits                                                                      IMPRESSION:   1. No CT evidence of acute sinusitis.  2. Probable mucous retention cysts in the right maxillary sinus.  Body mass index is 31.2 kg/m .    Patient apparently had septoplasty in the past and had a resulting septal perforation that  causes a lot of scabbing occasional nosebleeds even with sling.  He was seen in consultation by another surgeon who did not feel that he could safely repair the perforation.  Patient's main complaint is nasal obstruction congestion scabbing some whistling.    BP Readings from Last 1 Encounters:   01/22/25 116/62       BP noted to be well controlled today in office.     Sundeep IS NOT a smoker/uses chewing tobacco.   Past Medical History -   Past Medical History:   Diagnosis Date     Diabetes (H) 1/1/2014     Hypertension 6/24/2008       Current Medications -   Current Outpatient Medications:      atorvastatin (LIPITOR) 40 MG tablet, Take 1 tablet (40 mg) by mouth daily., Disp: 90 tablet, Rfl: 2     azelastine (ASTELIN) 0.1 % nasal spray, Spray 1 spray into both nostrils 2 times daily., Disp: , Rfl:      blood glucose (NO BRAND SPECIFIED) lancets standard, Use to test blood sugar 1 time daily or as directed., Disp: 100 Lancet, Rfl: 3     blood glucose (NO BRAND SPECIFIED) test strip, Use to test blood sugar once daily., Disp: 100 strip, Rfl: 3     Dapagliflozin Pro-metFORMIN ER (XIGDUO XR)  MG TB24, Take 1 tablet by mouth daily., Disp: 90 tablet, Rfl: 0     doxycycline hyclate (VIBRAMYCIN) 100 MG capsule, Take 1 capsule (100 mg) by mouth 2 times daily., Disp: 60 capsule, Rfl: 0     fenofibrate (TRIGLIDE/LOFIBRA) 160 MG tablet, Take 1 tablet (160 mg) by mouth daily., Disp: 90 tablet, Rfl: 3     fluticasone (FLONASE) 50 MCG/ACT nasal spray, Spray 1 spray into both nostrils daily., Disp: 16 g, Rfl: 1     halobetasol (ULTRAVATE) 0.05 % external cream, , Disp: , Rfl:      insulin glargine (LANTUS PEN) 100 UNIT/ML pen, Inject 20 Units subcutaneously every evening, Disp: 30 mL, Rfl: 1     insulin pen needle (31G X 5 MM) 31G X 5 MM miscellaneous, Use pen needles daily with current daily insulin regimen and Mounjaro., Disp: 300 each, Rfl: 2     JATENZO 237 MG CAPS, Take 237 mg by mouth 2 times daily., Disp: 180 capsule,  Rfl: 1     losartan (COZAAR) 50 MG tablet, Take 1 tablet (50 mg) by mouth daily., Disp: 90 tablet, Rfl: 3     MOUNJARO 7.5 MG/0.5ML pen, Inject 7.5 mg subcutaneously once a week, Disp: 2 mL, Rfl: 11     NIFEdipine ER (ADALAT CC) 30 MG 24 hr tablet, Take 1 tablet (30 mg) by mouth daily., Disp: 90 tablet, Rfl: 3     pantoprazole (PROTONIX) 40 MG EC tablet, Take 1 tablet (40 mg) by mouth 2 times daily., Disp: 180 tablet, Rfl: 3     tadalafil (ADCIRCA/CIALIS) 20 MG tablet, Take 1 tablet (20 mg) by mouth every 24 hours., Disp: 90 tablet, Rfl: 3     traZODone (DESYREL) 50 MG tablet, Take 1 tablet (50 mg) by mouth every evening Take 1 hour before bedtime., Disp: 90 tablet, Rfl: 3     vitamin D3 (CHOLECALCIFEROL) 50 mcg (2000 units) tablet, , Disp: , Rfl:     Allergies -   Allergies   Allergen Reactions     Ace Inhibitors Hives       Social History -   Social History     Socioeconomic History     Marital status:    Tobacco Use     Smoking status: Never     Passive exposure: Never     Smokeless tobacco: Never   Substance and Sexual Activity     Alcohol use: Yes     Comment: maybe three beers a week     Drug use: Never     Sexual activity: Yes     Partners: Female     Birth control/protection: Male Surgical   Other Topics Concern     Parent/sibling w/ CABG, MI or angioplasty before 65F 55M? Yes     Comment: My father had bypass surgery in 2012     Social Drivers of Health     Interpersonal Safety: Low Risk  (1/13/2025)    Interpersonal Safety      Do you feel physically and emotionally safe where you currently live?: Yes      Within the past 12 months, have you been hit, slapped, kicked or otherwise physically hurt by someone?: No      Within the past 12 months, have you been humiliated or emotionally abused in other ways by your partner or ex-partner?: No       Family History -   Family History   Problem Relation Age of Onset     Diabetes Father         Became diabetic after heart surgery     Hypertension Father       "Breast Cancer Sister        Review of Systems - As per HPI and PMHx, otherwise review of system review of the head and neck negative. Otherwise 10+ review of system is negative    Physical Exam  /62   Temp 98.7  F (37.1  C) (Temporal)   Ht 1.88 m (6' 2\")   Wt 110.2 kg (243 lb)   BMI 31.20 kg/m    BMI: Body mass index is 31.2 kg/m .    General - The patient is well nourished and well developed, and appears to have good nutritional status.  Alert and oriented to person and place, answers questions and cooperates with examination appropriately.    SKIN - No suspicious lesions or rashes.  Respiration - No respiratory distress.  Head and Face - Normocephalic and atraumatic, with no gross asymmetry noted of the contour of the facial features.  The facial nerve is intact, with strong symmetric movements.    Voice and Breathing - The patient was breathing comfortably without the use of accessory muscles. The patients voice was clear and strong, and had appropriate pitch and quality.    Ears - Bilateral pinna and EACs with normal appearing overlying skin. Tympanic membrane intact with good mobility on pneumatic otoscopy bilaterally. Bony landmarks of the ossicular chain are normal. The tympanic membranes are normal in appearance. No retraction, perforation, or masses.  No fluid or purulence was seen in the external canal or the middle ear.     Eyes - Extraocular movements intact.  Sclera were not icteric or injected, conjunctiva were pink and moist.    Mouth - Examination of the oral cavity showed pink, healthy oral mucosa. No lesions or ulcerations noted.  The tongue was mobile and midline, and the dentition were in good condition.      Throat - The walls of the oropharynx were smooth, pink, moist, symmetric, and had no lesions or ulcerations.  The tonsillar pillars and soft palate were symmetric.  The uvula was midline on elevation.    Neck - Normal midline excursion of the laryngotracheal complex during " swallowing.  Full range of motion on passive movement.  Palpation of the occipital, submental, submandibular, internal jugular chain, and supraclavicular nodes did not demonstrate any abnormal lymph nodes or masses.  The carotid pulse was palpable bilaterally.  Palpation of the thyroid was soft and smooth, with no nodules or goiter appreciated.  The trachea was mobile and midline.    Nose - External contour is symmetric, no gross deflection or scars.  Nasal mucosa is pink and moist with some dry mucus around the perforation anteriorly.  The septum was still somewhat deviated posterior to the perforation to the right and somewhat obstructive, turbinates of enlarged size and position.  No polyps, masses, or purulence noted on examination.  About 3 x 4 mm anterior perforation was appreciated in the septum.  Neuro - Nonfocal neuro exam is normal, CN 2 through 12 intact, normal gait and muscle tone.      Performed in clinic today:  After decongestion with Ramírez-Synephrine patient could breathe a little bit better he had had more of a nasal valve collapse on the left side improved with [place] maneuver.      A/P - Sundeep MACKENZIE Fernandez is a 54 year old male with nasal obstruction congestion partly due to scabbing from nasal perforation partly due to some persistence of turbinate enlargement as well as a little bit of deviated septum posterior to the perforation.  At this point we discussed potentially addressing this with the repair of septal perforation possibly revision of septoplasty posteriorly even though quite risky with the existing perforation and reduction of turbinates and even considering the nasal valve area.  Due to the perforation will send the patient to see Dr. Wojciech Linton, ENT facial plastics reconstructive surgeon in [place] for further evaluation.  In the meantime patient is counseled to continue using topical Vaseline on the septum on both sides to prevent further scabbing and nosebleeds.  40 minutes with  the patient today in the reviewing the information previously presented counseling and exam.      Luis Flanagan MD       Again, thank you for allowing me to participate in the care of your patient.        Sincerely,        Luis Flanagan MD, MD    Electronically signed

## 2025-01-23 ENCOUNTER — TELEPHONE (OUTPATIENT)
Dept: PULMONOLOGY | Facility: CLINIC | Age: 55
End: 2025-01-23
Payer: COMMERCIAL

## 2025-01-23 DIAGNOSIS — J18.9 PNEUMONIA OF RIGHT LOWER LOBE DUE TO INFECTIOUS ORGANISM: Primary | ICD-10-CM

## 2025-01-23 LAB
BACTERIA BRONCH: NORMAL
BACTERIA FLD CULT: NORMAL

## 2025-01-23 RX ORDER — LEVOFLOXACIN 750 MG/1
750 TABLET, FILM COATED ORAL DAILY
Qty: 14 TABLET | Refills: 0 | Status: SHIPPED | OUTPATIENT
Start: 2025-01-23 | End: 2025-02-06

## 2025-01-23 NOTE — TELEPHONE ENCOUNTER
Patient's last office visit was 12/18/24 with Cony Padilla for pulmonary nodules.  Per chart review:   Patient was recently prescribed 30 day supply of doxycycline on 12/9/24 for acute sinusitis.   Patient had ENT visit yesterday 1/22/25.  Patient had bronch with biopsy on 1/13/25.    Spoke with patient to gather information about symptoms. Patient c/o productive cough with white and green sputum, fatigue, fevers, chills, and night swears. Patient denies SOB at rest, RODRIGUEZ, and wheezing. Patient states that on Saturday he took his last dose of doxycycline and by Tuesday morning he started feeling fatigued. He states that Wednesday he became more lethargic, decreased appetite, had a low grade fever of 100.9, and was having night sweats and chills. Patient stated he took tylenol Wednesday night that helped with fever, and took ibuprofen this morning. Patient denies nausea, vomiting and diarrhea. Patient states he has not seen PCP for COVID and influenza testing.    Patient and wife stated they received a call from Cony about the bronch results likely being infectious but are wondering how the infection is being treated. Writer let patient and wife know that at the time of Cony's call a repeat CT chest in 3 month with follow up was recommended at that time.    RN will forward triage note for providers recommendations.    RN advised patient to seek care in the Emergency Department should the symptoms become emergent, to which patient agrees.    Lloyd Holt RN on 1/23/2025 at 11:28 AM  .

## 2025-01-23 NOTE — TELEPHONE ENCOUNTER
[facility] Call Center    Phone Message    May a detailed message be left on voicemail: yes     Reason for Call: Pt had procedure done 1/13/25 with Dr. Ocasio, was told to contact Cony Padilla PA-C's office right away if he developed a fever. Wife calling to report that he did have a fever last night, would like to discuss with RN (ok to call and speak with either pt or his wife Justina).     Action Taken: Other: Pulm    Travel Screening: Not Applicable

## 2025-01-23 NOTE — CONFIDENTIAL NOTE
Telephone note    Discussed with ashwini and his significant other. His BAL did grow staph epi which is usually normal silvana/contaminant, however it's possible this could cause an infection. Based on the sensitivity profile, it is resistant to doxycycline but sensitive to fluoroquinolones. Start levaquin 750mg daily x 14 days. He is scheduled to have a repeat CT and follow up with me.   Patient was instructed to stop levaquin if having tendon discomfort.    Cony Padilla PA-C  Interventional and General Pulmonology  Department of Pulmonary, Allergy, Critical Care and Sleep Medicine   [facility]

## 2025-01-30 LAB
BACTERIA BRONCH: NORMAL
BACTERIA FLD CULT: NORMAL

## 2025-02-06 LAB
BACTERIA BRONCH: NORMAL
BACTERIA FLD CULT: NORMAL

## 2025-02-10 LAB
BACTERIA BRONCH: NO GROWTH
BACTERIA FLD CULT: NO GROWTH

## 2025-02-24 ENCOUNTER — MYC MEDICAL ADVICE (OUTPATIENT)
Dept: FAMILY MEDICINE | Facility: CLINIC | Age: 55
End: 2025-02-24
Payer: COMMERCIAL

## 2025-02-24 NOTE — TELEPHONE ENCOUNTER
Please relate to mychart.    OV scheduled with PCP this Thursday to discuss insomnia.    Karishma Damon RN, BSN  [facility]

## 2025-02-27 ENCOUNTER — OFFICE VISIT (OUTPATIENT)
Dept: FAMILY MEDICINE | Facility: CLINIC | Age: 55
End: 2025-02-27
Payer: COMMERCIAL

## 2025-02-27 VITALS
SYSTOLIC BLOOD PRESSURE: 129 MMHG | OXYGEN SATURATION: 97 % | HEART RATE: 84 BPM | HEIGHT: 74 IN | TEMPERATURE: 97.6 F | RESPIRATION RATE: 13 BRPM | WEIGHT: 247.8 LBS | DIASTOLIC BLOOD PRESSURE: 75 MMHG | BODY MASS INDEX: 31.8 KG/M2

## 2025-02-27 DIAGNOSIS — A09 TRAVELER'S DIARRHEA: Primary | ICD-10-CM

## 2025-02-27 DIAGNOSIS — G47.00 INSOMNIA, UNSPECIFIED TYPE: ICD-10-CM

## 2025-02-27 PROCEDURE — G2211 COMPLEX E/M VISIT ADD ON: HCPCS | Performed by: INTERNAL MEDICINE

## 2025-02-27 PROCEDURE — 3078F DIAST BP <80 MM HG: CPT | Performed by: INTERNAL MEDICINE

## 2025-02-27 PROCEDURE — 3074F SYST BP LT 130 MM HG: CPT | Performed by: INTERNAL MEDICINE

## 2025-02-27 PROCEDURE — 1126F AMNT PAIN NOTED NONE PRSNT: CPT | Performed by: INTERNAL MEDICINE

## 2025-02-27 PROCEDURE — 99213 OFFICE O/P EST LOW 20 MIN: CPT | Performed by: INTERNAL MEDICINE

## 2025-02-27 RX ORDER — TRAZODONE HYDROCHLORIDE 50 MG/1
50-100 TABLET ORAL EVERY EVENING
Qty: 180 TABLET | Refills: 3 | Status: SHIPPED | OUTPATIENT
Start: 2025-02-27

## 2025-02-27 RX ORDER — ZOLPIDEM TARTRATE 5 MG/1
5 TABLET ORAL
Qty: 30 TABLET | Refills: 5 | Status: SHIPPED | OUTPATIENT
Start: 2025-02-27

## 2025-02-27 RX ORDER — AZITHROMYCIN 250 MG/1
TABLET, FILM COATED ORAL
Qty: 6 TABLET | Refills: 1 | Status: SHIPPED | OUTPATIENT
Start: 2025-02-27

## 2025-02-27 ASSESSMENT — PAIN SCALES - GENERAL: PAINLEVEL_OUTOF10: NO PAIN (0)

## 2025-02-27 NOTE — PROGRESS NOTES
"  Insomnia  Onset/Duration: Since July of 2024  Description:   Frequency of insomnia:  nightly  Time to fall asleep (sleep latency): 15 minutes or less  Middle of night awakening:  YES  Early morning awakening:  YES around 3-4 O'clock   Progression of Symptoms:  same  Accompanying Signs & Symptoms:  Daytime sleepiness/napping: No  Excessive snoring/apnea: YES- diagnosed with sleep apnea a few years ago  Restless legs: No  Waking to urinate: YES  Chronic pain:  No  Depression symptoms (if yes, do PHQ9): No  Anxiety symptoms (if yes, do CAROL-7): No  History:  Prior Insomnia: YES  New stressful situation: No  Precipitating factors:   Caffeine intake: YES  OTC decongestants: No  Any new medications: No  Alleviating factors:  Self medicating (alcohol, etc.):  No  Stress-reduction (exercise, yoga, meditation etc): No  Therapies tried and outcome: Benadryl -  effective and Trazodone -  not effective  {additonal problems for provider to add (Optional):230842}    {ROS Picklists (Optional):787456}      Objective    /75 (BP Location: Left arm, Patient Position: Sitting, Cuff Size: Adult Large)   Pulse 84   Temp 97.6  F (36.4  C) (Temporal)   Resp 13   Ht 1.88 m (6' 2\")   Wt 112.4 kg (247 lb 12.8 oz)   SpO2 97%   BMI 31.82 kg/m    Body mass index is 31.82 kg/m .  Physical Exam   {Exam List (Optional):424102}    {Diagnostic Test Results (Optional):917977}        Signed Electronically by: Mahesh Mayo MD  {Email feedback regarding this note to [email or web address]   :[id number]}  " "    Became diabetic after heart surgery    Hypertension Father     Breast Cancer Sister          Allergies   Allergen Reactions    Ace Inhibitors Hives     Recent Labs   Lab Test 01/09/25  1421 12/19/24  1708 11/29/24  1042 07/18/24  0819   A1C 5.7*  --   --  6.4*   LDL  --   --   --  65   HDL  --   --   --  18*   TRIG  --   --   --  218*   ALT  --  15 8 13   CR  --  1.20* 1.19* 1.12   GFRESTIMATED  --  71 72 77   POTASSIUM  --  4.3 4.6 4.7   TSH  --  1.80  --   --       BP Readings from Last 3 Encounters:   02/27/25 129/75   01/22/25 116/62   01/14/25 136/66    Wt Readings from Last 3 Encounters:   02/27/25 112.4 kg (247 lb 12.8 oz)   01/22/25 110.2 kg (243 lb)   01/14/25 109.4 kg (241 lb 2.9 oz)                    ROS:  C: NEGATIVE for fever, chills, change in weight  I: NEGATIVE for worrisome rashes, moles or lesions  E: NEGATIVE for vision changes or irritation  E/M: NEGATIVE for ear, mouth and throat problems  R: NEGATIVE for significant cough or SOB  B: NEGATIVE for masses, tenderness or discharge  CV: NEGATIVE for chest pain, palpitations or peripheral edema  GI: NEGATIVE for nausea, abdominal pain, heartburn, or change in bowel habits  : NEGATIVE for frequency, dysuria, or hematuria  M: NEGATIVE for significant arthralgias or myalgia  N: NEGATIVE for weakness, dizziness or paresthesias  E: NEGATIVE for temperature intolerance, skin/hair changes  H: NEGATIVE for bleeding problems  P: NEGATIVE for changes in mood or affect    OBJECTIVE:                                                    /75 (BP Location: Left arm, Patient Position: Sitting, Cuff Size: Adult Large)   Pulse 84   Temp 97.6  F (36.4  C) (Temporal)   Resp 13   Ht 1.88 m (6' 2\")   Wt 112.4 kg (247 lb 12.8 oz)   SpO2 97%   BMI 31.82 kg/m    Body mass index is 31.82 kg/m .  GENERAL: alert, not in distress, oriented to time, place and person.  EYES: Eyes grossly normal to inspection  HENT: normal cephalic/atraumatic,  RESP: Symmetrical " chest expansion,  CV: No peripheral edema  MS: no gross musculoskeletal defects noted, no edema  SKIN: no suspicious lesions or rashes  NEURO: Normal strength and tone, mentation intact and speech normal  PSYCH: mentation appears normal, affect normal/bright     Diagnostic Test Results:  No results found for any visits on 02/27/25.     ASSESSMENT/PLAN:                                                      Insomnia, unspecified type  - traZODone (DESYREL) 50 MG tablet; Take 1-2 tablets ( mg) by mouth every evening. Take 1 hour before bedtime.  - zolpidem (AMBIEN) 5 MG tablet; Take 1 tablet (5 mg) by mouth nightly as needed for sleep. Only as needed if Trazodone isn't effective.    Traveler's diarrhea  - azithromycin (ZITHROMAX) 250 MG tablet; Two tablets first day, then one tablet daily for four days.     Disposition:  Follow-up in 24 weeks.    Mahesh Mayo MD  [facility]

## 2025-03-10 LAB
ACID FAST STAIN (ARUP): NORMAL

## 2025-03-30 ENCOUNTER — MYC REFILL (OUTPATIENT)
Dept: FAMILY MEDICINE | Facility: CLINIC | Age: 55
End: 2025-03-30
Payer: COMMERCIAL

## 2025-03-30 DIAGNOSIS — G47.00 INSOMNIA, UNSPECIFIED TYPE: ICD-10-CM

## 2025-03-31 ENCOUNTER — MYC MEDICAL ADVICE (OUTPATIENT)
Dept: FAMILY MEDICINE | Facility: CLINIC | Age: 55
End: 2025-03-31
Payer: COMMERCIAL

## 2025-03-31 RX ORDER — TRAZODONE HYDROCHLORIDE 50 MG/1
50-100 TABLET ORAL EVERY EVENING
Qty: 180 TABLET | Refills: 3 | OUTPATIENT
Start: 2025-03-31

## 2025-03-31 NOTE — TELEPHONE ENCOUNTER
RN spoke with pharmacist. Prescription is currently in process and they will notify pt when it is ready for , prescription can be picked up prior to 5/9/25. Pharmacy was likely running old script for 1 per day.     Sheila Sawyer RN

## 2025-04-07 ENCOUNTER — OFFICE VISIT (OUTPATIENT)
Dept: ENDOCRINOLOGY | Facility: CLINIC | Age: 55
End: 2025-04-07
Attending: INTERNAL MEDICINE
Payer: COMMERCIAL

## 2025-04-07 VITALS
OXYGEN SATURATION: 97 % | RESPIRATION RATE: 18 BRPM | SYSTOLIC BLOOD PRESSURE: 134 MMHG | TEMPERATURE: 97.2 F | HEART RATE: 77 BPM | DIASTOLIC BLOOD PRESSURE: 69 MMHG | BODY MASS INDEX: 32.51 KG/M2 | WEIGHT: 253.3 LBS | HEIGHT: 74 IN

## 2025-04-07 DIAGNOSIS — R79.89 LOW TESTOSTERONE IN MALE: ICD-10-CM

## 2025-04-07 DIAGNOSIS — E66.9 TYPE 2 DIABETES MELLITUS WITH OBESITY (H): Primary | ICD-10-CM

## 2025-04-07 DIAGNOSIS — E29.1 MALE HYPOGONADISM: ICD-10-CM

## 2025-04-07 DIAGNOSIS — E11.69 TYPE 2 DIABETES MELLITUS WITH OBESITY (H): Primary | ICD-10-CM

## 2025-04-07 PROCEDURE — 3078F DIAST BP <80 MM HG: CPT | Performed by: PHYSICIAN ASSISTANT

## 2025-04-07 PROCEDURE — 3075F SYST BP GE 130 - 139MM HG: CPT | Performed by: PHYSICIAN ASSISTANT

## 2025-04-07 PROCEDURE — 99204 OFFICE O/P NEW MOD 45 MIN: CPT | Performed by: PHYSICIAN ASSISTANT

## 2025-04-07 RX ORDER — LANCETS 33 GAUGE
EACH MISCELLANEOUS
COMMUNITY
Start: 2025-01-08 | End: 2025-04-07

## 2025-04-07 RX ORDER — TESTOSTERONE UNDECANOATE 237 MG/1
237 CAPSULE, LIQUID FILLED ORAL 2 TIMES DAILY
Qty: 180 CAPSULE | Refills: 1 | Status: CANCELLED | OUTPATIENT
Start: 2025-04-07

## 2025-04-07 RX ORDER — TIRZEPATIDE 7.5 MG/.5ML
INJECTION, SOLUTION SUBCUTANEOUS
COMMUNITY
Start: 2025-04-06

## 2025-04-07 NOTE — LETTER
4/7/2025      Sundeep Fernandez  [address] [place] [address]      Dear Colleague,    Thank you for referring your patient, Sundeep Fernandez, to the [facility]. Please see a copy of my visit note below.    Assessment/Plan :   Hypogonadism. Chente seems to be very stable on Jatenzo. We discussed the possible adverse effects of testosterone replacement and he seems to be doing well. He is overdue for laboratory testing and I will place an order today. It should be drawn in the morning, so he will have to schedule a draw later in the week. If his levels remain stable, we will continue with his current dose of Jatenzo. We will plan on a follow-up appt in 6 mos.   Type 2 DM. Chente seems to be doing well. He is happy with his current medications and his blood sugars seem to be stable. We did discuss the risk of low blood sugars with insulin therapy and I am worried that he may be experiencing more lows than he realizes. A sample of the Kulwant 3 sensor was given to him today and set up, in clinic. This will help him prevent any severe low blood sugars and it may lead to improved control of his blood sugars. If he likes the device, he will contact our office for a prescription. He is due for routine laboratory testing and I will place an order today. We will follow-up in 6 mos.       I have independently reviewed and interpreted labs, imaging as indicated.      Chief complaint:  Sundeep is a 54 year old male seen in consultation at the request of Dr. Mayo for type 2 diabetes and hypogonadism.    I have reviewed Care Everywhere including [facility], [facility], [facility], [place],[facility], [facility], [place], [facility], [facility] , [facility], [place] lab reports, imaging reports and provider notes as indicated.      HISTORY OF PRESENT ILLNESS  Chente feels like everything started to go downhill after he turned 38. It was around that time that he started to struggle with fatigue and weight loss. He finally went  in to his primary care provider and he was found to have low testosterone. He was referred to urology and they started him on testosterone replacement. He has tried various forms of testosterone replacement and ended up with Jatenzo. It seems to work very well, with minimal adverse effects. He is currently taking 237 mg twice daily. He has not had any problems with ongoing fatigue and he has not noticed any impact on urinary urgency or stream force.    Chente was also recently diagnosed with type 2 diabetes. He was started on a combination of metformin, dapagliflozin, and Mounjaro. This combination seemed to work well but he just couldn't get his morning blood sugars under 150 mg/dl. Once daily Lantus was then added and it led to an immediate improvement in his blood sugars. He currently takes 20 unit(s) of Lantus daily, Xigduo XR 10/1000 mg daily and Mounjaro 7.5 mg weekly. He monitors his blood sugars with fingerstick testing daily.    Chente has no history of sever hyperglycemia and/or hypoglycemia. He has had symptoms related to low blood sugars. Last year while working in the hard, he had an episode where he became very light headed and felt like he was going to pass out. He doesn't believe that he has had any issues with overnight lows. He also has not noticed any changes in his vision or issues with numbness/tingling in his feet. Overall, he feels good. He feels like his current medications are working well.     Endocrine relevant labs are as follows:   Latest Reference Range & Units 01/09/25 14:21   Hemoglobin A1C 0.0 - 5.6 % 5.7 (H)   (H): Data is abnormally high   Latest Reference Range & Units 10/15/24 08:00   Testosterone Total 240 - 950 ng/dL 250      Latest Reference Range & Units 10/15/24 08:00   Free Testosterone Calculated ng/dL 7.40      Latest Reference Range & Units 07/18/24 08:19   Hemoglobin A1C 0.0 - 5.6 % 6.4 (H)   (H): Data is abnormally high   Latest Reference Range & Units 07/18/24 08:21    Albumin Urine mg/g Cr 0.00 - 17.00 mg/g Cr 10.24      Latest Reference Range & Units 07/18/24 08:21   Albumin Urine mg/L mg/L 13.0     REVIEW OF SYSTEMS    Endocrine: positive for diabetes and low testosterone  Skin: negative  Eyes: negative for, visual blurring, redness, tearing  Ears/Nose/Throat: negative  Respiratory: No shortness of breath, dyspnea on exertion, cough, or hemoptysis  Cardiovascular: negative for, chest pain, dyspnea on exertion, lower extremity edema, and exercise intolerance  Gastrointestinal: negative for, nausea, vomiting, constipation, and diarrhea  Genitourinary: negative for, nocturia, dysuria, frequency, and urgency  Musculoskeletal: negative for, muscular weakness, nocturnal cramping, and foot pain  Neurologic: negative for, local weakness, numbness or tingling of hands, and numbness or tingling of feet  Psychiatric: negative  Hematologic/Lymphatic/Immunologic: negative    Past Medical History  Past Medical History:   Diagnosis Date     Diabetes (H) 1/1/2014     Hypertension 6/24/2008       Medications  Current Outpatient Medications   Medication Sig Dispense Refill     atorvastatin (LIPITOR) 40 MG tablet Take 1 tablet (40 mg) by mouth daily. 90 tablet 2     azithromycin (ZITHROMAX) 250 MG tablet Two tablets first day, then one tablet daily for four days. 6 tablet 1     blood glucose (NO BRAND SPECIFIED) lancets standard Use to test blood sugar 1 time daily or as directed. 100 Lancet 3     blood glucose (NO BRAND SPECIFIED) test strip Use to test blood sugar once daily. 100 strip 3     Dapagliflozin Pro-metFORMIN ER (XIGDUO XR)  MG TB24 Take 1 tablet by mouth daily. 90 tablet 0     fenofibrate (TRIGLIDE/LOFIBRA) 160 MG tablet Take 1 tablet (160 mg) by mouth daily. 90 tablet 3     fluticasone (FLONASE) 50 MCG/ACT nasal spray Spray 1 spray into both nostrils daily. 16 g 1     halobetasol (ULTRAVATE) 0.05 % external cream        insulin glargine (LANTUS PEN) 100 UNIT/ML pen Inject  "20 Units subcutaneously every evening 30 mL 1     insulin pen needle (31G X 5 MM) 31G X 5 MM miscellaneous Use pen needles daily with current daily insulin regimen and Mounjaro. 300 each 2     JATENZO 237 MG CAPS Take 237 mg by mouth 2 times daily. 180 capsule 1     losartan (COZAAR) 50 MG tablet Take 1 tablet (50 mg) by mouth daily. 90 tablet 3     MOUNJARO 7.5 MG/0.5ML SOAJ auto-injector pen        NIFEdipine ER (ADALAT CC) 30 MG 24 hr tablet Take 1 tablet (30 mg) by mouth daily. 90 tablet 3     pantoprazole (PROTONIX) 40 MG EC tablet Take 1 tablet (40 mg) by mouth 2 times daily. 180 tablet 3     tadalafil (ADCIRCA/CIALIS) 20 MG tablet Take 1 tablet (20 mg) by mouth every 24 hours. 90 tablet 3     traZODone (DESYREL) 50 MG tablet Take 1-2 tablets ( mg) by mouth every evening. Take 1 hour before bedtime. 180 tablet 3     vitamin D3 (CHOLECALCIFEROL) 50 mcg (2000 units) tablet        zolpidem (AMBIEN) 5 MG tablet Take 1 tablet (5 mg) by mouth nightly as needed for sleep. Only as needed if Trazodone isn't effective. 30 tablet 5       Allergies  Allergies   Allergen Reactions     Ace Inhibitors Hives         Family History  family history includes Breast Cancer in his sister; Diabetes in his father; Hypertension in his father.    Social History  Social History     Tobacco Use     Smoking status: Never     Passive exposure: Never     Smokeless tobacco: Never   Vaping Use     Vaping status: Some Days   Substance Use Topics     Alcohol use: Yes     Comment: maybe three beers a week     Drug use: Never       Physical Exam  /69 (BP Location: Left arm, Patient Position: Chair, Cuff Size: Adult Large)   Pulse 77   Temp 97.2  F (36.2  C) (Oral)   Resp 18   Ht 1.88 m (6' 2.02\")   Wt 114.9 kg (253 lb 4.8 oz)   SpO2 97%   BMI 32.51 kg/m    Body mass index is 32.51 kg/m .  GENERAL :  In no apparent distress  SKIN: Normal color, normal temperature, texture.  No hirsutism, alopecia or purple striae.     EYES: " PERRL, EOMI, No scleral icterus,  No proptosis, conjunctival redness, stare, retraction  RESP: Lungs clear to auscultation bilaterally  CARDIAC: Regular rate and rhythm, normal S1 S2, without murmurs, rubs or gallops  NEURO: awake, alert, responds appropriately to questions.  Cranial nerves intact.   Moves all extremities; Gait normal.  No tremor of the outstretched hand.    EXTREMITIES: No clubbing, cyanosis or edema.    DATA REVIEW  He did not bring in his glucose logs  Reports morning blood sugars are usually around 110-120 mg/dl        Again, thank you for allowing me to participate in the care of your patient.        Sincerely,        Cely Hernandez PA-C    Electronically signed

## 2025-04-07 NOTE — PATIENT INSTRUCTIONS
[facility]  Dr Leger, Endocrinology Department    [address] [id number]  [place] [address]  Appointment [phone]  Fax: [phone]  [place]: Monday - Thursday

## 2025-04-07 NOTE — PROGRESS NOTES
Assessment/Plan :   Hypogonadism. Chente seems to be very stable on Jatenzo. We discussed the possible adverse effects of testosterone replacement and he seems to be doing well. He is overdue for laboratory testing and I will place an order today. It should be drawn in the morning, so he will have to schedule a draw later in the week. If his levels remain stable, we will continue with his current dose of Jatenzo. We will plan on a follow-up appt in 6 mos.   Type 2 DM. Chente seems to be doing well. He is happy with his current medications and his blood sugars seem to be stable. We did discuss the risk of low blood sugars with insulin therapy and I am worried that he may be experiencing more lows than he realizes. A sample of the Kulwant 3 sensor was given to him today and set up, in clinic. This will help him prevent any severe low blood sugars and it may lead to improved control of his blood sugars. If he likes the device, he will contact our office for a prescription. He is due for routine laboratory testing and I will place an order today. We will follow-up in 6 mos.       I have independently reviewed and interpreted labs, imaging as indicated.      Chief complaint:  Sundeep is a 54 year old male seen in consultation at the request of Dr. Mayo for type 2 diabetes and hypogonadism.    I have reviewed Care Everywhere including [facility], [facility], [facility], [facility] , [facility], [place] lab reports, imaging reports and provider notes as indicated.      HISTORY OF PRESENT ILLNESS  Chente feels like everything started to go downhill after he turned 38. It was around that time that he started to struggle with fatigue and weight loss. He finally went in to his primary care provider and he was found to have low testosterone. He was referred to urology and they started him on testosterone replacement. He has tried various forms of testosterone replacement and ended up with Jatenzo. It  seems to work very well, with minimal adverse effects. He is currently taking 237 mg twice daily. He has not had any problems with ongoing fatigue and he has not noticed any impact on urinary urgency or stream force.    Chente was also recently diagnosed with type 2 diabetes. He was started on a combination of metformin, dapagliflozin, and Mounjaro. This combination seemed to work well but he just couldn't get his morning blood sugars under 150 mg/dl. Once daily Lantus was then added and it led to an immediate improvement in his blood sugars. He currently takes 20 unit(s) of Lantus daily, Xigduo XR 10/1000 mg daily and Mounjaro 7.5 mg weekly. He monitors his blood sugars with fingerstick testing daily.    Chente has no history of sever hyperglycemia and/or hypoglycemia. He has had symptoms related to low blood sugars. Last year while working in the hard, he had an episode where he became very light headed and felt like he was going to pass out. He doesn't believe that he has had any issues with overnight lows. He also has not noticed any changes in his vision or issues with numbness/tingling in his feet. Overall, he feels good. He feels like his current medications are working well.     Endocrine relevant labs are as follows:   Latest Reference Range & Units 01/09/25 14:21   Hemoglobin A1C 0.0 - 5.6 % 5.7 (H)   (H): Data is abnormally high   Latest Reference Range & Units 10/15/24 08:00   Testosterone Total 240 - 950 ng/dL 250      Latest Reference Range & Units 10/15/24 08:00   Free Testosterone Calculated ng/dL 7.40      Latest Reference Range & Units 07/18/24 08:19   Hemoglobin A1C 0.0 - 5.6 % 6.4 (H)   (H): Data is abnormally high   Latest Reference Range & Units 07/18/24 08:21   Albumin Urine mg/g Cr 0.00 - 17.00 mg/g Cr 10.24      Latest Reference Range & Units 07/18/24 08:21   Albumin Urine mg/L mg/L 13.0     REVIEW OF SYSTEMS    Endocrine: positive for diabetes and low testosterone  Skin: negative  Eyes: negative  for, visual blurring, redness, tearing  Ears/Nose/Throat: negative  Respiratory: No shortness of breath, dyspnea on exertion, cough, or hemoptysis  Cardiovascular: negative for, chest pain, dyspnea on exertion, lower extremity edema, and exercise intolerance  Gastrointestinal: negative for, nausea, vomiting, constipation, and diarrhea  Genitourinary: negative for, nocturia, dysuria, frequency, and urgency  Musculoskeletal: negative for, muscular weakness, nocturnal cramping, and foot pain  Neurologic: negative for, local weakness, numbness or tingling of hands, and numbness or tingling of feet  Psychiatric: negative  Hematologic/Lymphatic/Immunologic: negative    Past Medical History  Past Medical History:   Diagnosis Date    Diabetes (H) 1/1/2014    Hypertension 6/24/2008       Medications  Current Outpatient Medications   Medication Sig Dispense Refill    atorvastatin (LIPITOR) 40 MG tablet Take 1 tablet (40 mg) by mouth daily. 90 tablet 2    azithromycin (ZITHROMAX) 250 MG tablet Two tablets first day, then one tablet daily for four days. 6 tablet 1    blood glucose (NO BRAND SPECIFIED) lancets standard Use to test blood sugar 1 time daily or as directed. 100 Lancet 3    blood glucose (NO BRAND SPECIFIED) test strip Use to test blood sugar once daily. 100 strip 3    Dapagliflozin Pro-metFORMIN ER (XIGDUO XR)  MG TB24 Take 1 tablet by mouth daily. 90 tablet 0    fenofibrate (TRIGLIDE/LOFIBRA) 160 MG tablet Take 1 tablet (160 mg) by mouth daily. 90 tablet 3    fluticasone (FLONASE) 50 MCG/ACT nasal spray Spray 1 spray into both nostrils daily. 16 g 1    halobetasol (ULTRAVATE) 0.05 % external cream       insulin glargine (LANTUS PEN) 100 UNIT/ML pen Inject 20 Units subcutaneously every evening 30 mL 1    insulin pen needle (31G X 5 MM) 31G X 5 MM miscellaneous Use pen needles daily with current daily insulin regimen and Mounjaro. 300 each 2    JATENZO 237 MG CAPS Take 237 mg by mouth 2 times daily. 180  "capsule 1    losartan (COZAAR) 50 MG tablet Take 1 tablet (50 mg) by mouth daily. 90 tablet 3    MOUNJARO 7.5 MG/0.5ML SOAJ auto-injector pen       NIFEdipine ER (ADALAT CC) 30 MG 24 hr tablet Take 1 tablet (30 mg) by mouth daily. 90 tablet 3    pantoprazole (PROTONIX) 40 MG EC tablet Take 1 tablet (40 mg) by mouth 2 times daily. 180 tablet 3    tadalafil (ADCIRCA/CIALIS) 20 MG tablet Take 1 tablet (20 mg) by mouth every 24 hours. 90 tablet 3    traZODone (DESYREL) 50 MG tablet Take 1-2 tablets ( mg) by mouth every evening. Take 1 hour before bedtime. 180 tablet 3    vitamin D3 (CHOLECALCIFEROL) 50 mcg (2000 units) tablet       zolpidem (AMBIEN) 5 MG tablet Take 1 tablet (5 mg) by mouth nightly as needed for sleep. Only as needed if Trazodone isn't effective. 30 tablet 5       Allergies  Allergies   Allergen Reactions    Ace Inhibitors Hives         Family History  family history includes Breast Cancer in his sister; Diabetes in his father; Hypertension in his father.    Social History  Social History     Tobacco Use    Smoking status: Never     Passive exposure: Never    Smokeless tobacco: Never   Vaping Use    Vaping status: Some Days   Substance Use Topics    Alcohol use: Yes     Comment: maybe three beers a week    Drug use: Never       Physical Exam  /69 (BP Location: Left arm, Patient Position: Chair, Cuff Size: Adult Large)   Pulse 77   Temp 97.2  F (36.2  C) (Oral)   Resp 18   Ht 1.88 m (6' 2.02\")   Wt 114.9 kg (253 lb 4.8 oz)   SpO2 97%   BMI 32.51 kg/m    Body mass index is 32.51 kg/m .  GENERAL :  In no apparent distress  SKIN: Normal color, normal temperature, texture.  No hirsutism, alopecia or purple striae.     EYES: PERRL, EOMI, No scleral icterus,  No proptosis, conjunctival redness, stare, retraction  RESP: Lungs clear to auscultation bilaterally  CARDIAC: Regular rate and rhythm, normal S1 S2, without murmurs, rubs or gallops  NEURO: awake, alert, responds appropriately to " questions.  Cranial nerves intact.   Moves all extremities; Gait normal.  No tremor of the outstretched hand.    EXTREMITIES: No clubbing, cyanosis or edema.    DATA REVIEW  He did not bring in his glucose logs  Reports morning blood sugars are usually around 110-120 mg/dl

## 2025-04-08 ENCOUNTER — LAB (OUTPATIENT)
Dept: LAB | Facility: CLINIC | Age: 55
End: 2025-04-08
Payer: COMMERCIAL

## 2025-04-08 DIAGNOSIS — R79.89 LOW TESTOSTERONE IN MALE: ICD-10-CM

## 2025-04-08 DIAGNOSIS — E11.69 TYPE 2 DIABETES MELLITUS WITH OBESITY (H): ICD-10-CM

## 2025-04-08 DIAGNOSIS — E29.1 MALE HYPOGONADISM: ICD-10-CM

## 2025-04-08 DIAGNOSIS — E66.9 TYPE 2 DIABETES MELLITUS WITH OBESITY (H): ICD-10-CM

## 2025-04-08 LAB
ERYTHROCYTE [DISTWIDTH] IN BLOOD BY AUTOMATED COUNT: 16.5 % (ref 10–15)
EST. AVERAGE GLUCOSE BLD GHB EST-MCNC: 105 MG/DL
HBA1C MFR BLD: 5.3 % (ref 0–5.6)
HCT VFR BLD AUTO: 49.7 % (ref 40–53)
HGB BLD-MCNC: 16.5 G/DL (ref 13.3–17.7)
MCH RBC QN AUTO: 27.4 PG (ref 26.5–33)
MCHC RBC AUTO-ENTMCNC: 33.2 G/DL (ref 31.5–36.5)
MCV RBC AUTO: 83 FL (ref 78–100)
PLATELET # BLD AUTO: 196 10E3/UL (ref 150–450)
RBC # BLD AUTO: 6.02 10E6/UL (ref 4.4–5.9)
WBC # BLD AUTO: 4.2 10E3/UL (ref 4–11)

## 2025-04-08 PROCEDURE — 85027 COMPLETE CBC AUTOMATED: CPT

## 2025-04-08 PROCEDURE — 80053 COMPREHEN METABOLIC PANEL: CPT

## 2025-04-08 PROCEDURE — 36415 COLL VENOUS BLD VENIPUNCTURE: CPT

## 2025-04-08 PROCEDURE — 84270 ASSAY OF SEX HORMONE GLOBUL: CPT

## 2025-04-08 PROCEDURE — 83036 HEMOGLOBIN GLYCOSYLATED A1C: CPT

## 2025-04-08 PROCEDURE — 84443 ASSAY THYROID STIM HORMONE: CPT

## 2025-04-09 LAB
ALBUMIN SERPL BCG-MCNC: 4.5 G/DL (ref 3.5–5.2)
ALP SERPL-CCNC: 62 U/L (ref 40–150)
ALT SERPL W P-5'-P-CCNC: 6 U/L (ref 0–70)
ANION GAP SERPL CALCULATED.3IONS-SCNC: 11 MMOL/L (ref 7–15)
AST SERPL W P-5'-P-CCNC: 28 U/L (ref 0–45)
BILIRUB SERPL-MCNC: 0.4 MG/DL
BUN SERPL-MCNC: 17.9 MG/DL (ref 6–20)
CALCIUM SERPL-MCNC: 9.8 MG/DL (ref 8.8–10.4)
CHLORIDE SERPL-SCNC: 106 MMOL/L (ref 98–107)
CREAT SERPL-MCNC: 1.18 MG/DL (ref 0.67–1.17)
EGFRCR SERPLBLD CKD-EPI 2021: 72 ML/MIN/1.73M2
GLUCOSE SERPL-MCNC: 96 MG/DL (ref 70–99)
HCO3 SERPL-SCNC: 26 MMOL/L (ref 22–29)
POTASSIUM SERPL-SCNC: 5 MMOL/L (ref 3.4–5.3)
PROT SERPL-MCNC: 7.1 G/DL (ref 6.4–8.3)
SHBG SERPL-SCNC: 18 NMOL/L (ref 11–80)
SODIUM SERPL-SCNC: 143 MMOL/L (ref 135–145)
TSH SERPL DL<=0.005 MIU/L-ACNC: 2.78 UIU/ML (ref 0.3–4.2)

## 2025-04-13 DIAGNOSIS — E29.1 MALE HYPOGONADISM: ICD-10-CM

## 2025-04-14 RX ORDER — TESTOSTERONE UNDECANOATE 237 MG/1
1 CAPSULE, LIQUID FILLED ORAL 2 TIMES DAILY
Qty: 180 CAPSULE | Refills: 1 | Status: SHIPPED | OUTPATIENT
Start: 2025-04-14 | End: 2025-04-16

## 2025-04-16 ENCOUNTER — ANCILLARY PROCEDURE (OUTPATIENT)
Dept: CT IMAGING | Facility: CLINIC | Age: 55
End: 2025-04-16
Attending: PHYSICIAN ASSISTANT
Payer: COMMERCIAL

## 2025-04-16 DIAGNOSIS — E29.1 MALE HYPOGONADISM: ICD-10-CM

## 2025-04-16 DIAGNOSIS — R91.8 PULMONARY NODULES: ICD-10-CM

## 2025-04-16 PROCEDURE — 71250 CT THORAX DX C-: CPT | Mod: GC | Performed by: RADIOLOGY

## 2025-04-16 RX ORDER — TESTOSTERONE UNDECANOATE 237 MG/1
1 CAPSULE, LIQUID FILLED ORAL 2 TIMES DAILY
Qty: 180 CAPSULE | Refills: 1 | Status: SHIPPED | OUTPATIENT
Start: 2025-04-16

## 2025-04-21 ENCOUNTER — MYC MEDICAL ADVICE (OUTPATIENT)
Dept: ENDOCRINOLOGY | Facility: CLINIC | Age: 55
End: 2025-04-21
Payer: COMMERCIAL

## 2025-04-21 ENCOUNTER — TELEPHONE (OUTPATIENT)
Dept: ENDOCRINOLOGY | Facility: CLINIC | Age: 55
End: 2025-04-21
Payer: COMMERCIAL

## 2025-04-21 DIAGNOSIS — E11.69 TYPE 2 DIABETES MELLITUS WITH OBESITY (H): Primary | ICD-10-CM

## 2025-04-21 DIAGNOSIS — E66.9 TYPE 2 DIABETES MELLITUS WITH OBESITY (H): Primary | ICD-10-CM

## 2025-04-21 RX ORDER — ACYCLOVIR 400 MG/1
TABLET ORAL
Qty: 3 EACH | Refills: 5 | Status: SHIPPED | OUTPATIENT
Start: 2025-04-21

## 2025-04-21 RX ORDER — HYDROCHLOROTHIAZIDE 12.5 MG/1
CAPSULE ORAL
Qty: 6 EACH | Refills: 1 | OUTPATIENT
Start: 2025-04-21 | End: 2025-04-21

## 2025-04-21 NOTE — TELEPHONE ENCOUNTER
Hello,    I did a test claim on Freestyle Kulwant 3 PLUS Sensor and the patient's insurance([employer]) rejects stating that the patient use use Dexcom CGM. Can the patient use Dexcom G7? If no, please have visitation notes stating why patient cannot use Dexcom CGM. If yes, send a new prescription for the Dexcom G7 Sensors(which is a $0 co-pay).    Thank You!    [facility] Liaison  [facility]  [email or web address]  Phone: [phone]  Fax: [phone]    Kulwant 3 PLUS Sensor test claim:      Dexcom G7 Sensor Test Claim:

## 2025-04-21 NOTE — CONSULTS
"    Outpatient IR Referral  04/21/25    Referring Provider: Cony Padilla PA-C  IR Referral Request: Right lower lobe lung nodule biopsy  Recommendations/Plan:  IR right lower lung lesion biopsy with CT guidance.  See series 4, image 126 from CT 4/16/25. Patient to hold mounjaro x7 days prior to procedure.   Tissue diagnostics entered under referrer.    Case and imaging was reviewed with Dr. Sylvain Freitas MD.  IR recommendations also relayed Epic messaging.    IR referral converted to procedure order.        Brief History:    Sundeep Fernandez is a 54 year old male with history of pituitary adenoma, JAIME, DMII and HL who IR is being asked to biopsy a right lower lobe lung nodule.     Per report, patient had a CXR in 11/2024 that showed faint right mid lung nodular density measuring 1.5 cm. At the time, an infectious process seemed the most likely given symptoms, however, patient had multiple rounds of antibiotics with persistence. On 1/13/25, he had a CT which showed the nodule to be slightly smaller so an EBUS + BAL was completed and did not do a biopsy of the RLL nodule. During that procedure, they did a FNA lymph nodes station 7, 4R showed non-necrotizing granulomatous lymphadenitis, negative for malignancy. Histoplasma ag, AFB and fungal cultures negative. Staph epi +, possible contaminant, but given patient reported symptom I prescribed a 14 day course of levaquin and ordered 3 month follow up CT chest. After that his symptoms resolved and he has been feeling well.    CT chest 4/2025  showed increase in comparison to last scan on 1/13/25. Patient also has mediastinal and right hilar lymphadenopathy.      Recent CT from 4/16/25 shows:  \"Increasing size of soft tissue density in the superior right lower  lobe with extension along the fissure toward the hilum and multiple  satellite micronodules. This is highly concerning for malignancy (most  likely diagnosis would be adenocarcinoma), and tissue sampling should  be " "considered despite previous mediastinal lymph node biopsy negative  for malignancy.\"    Per report, if denied for biopsy, then interventional pulm will consider ION navigational bronchoscopy with biopsy to r/o malignancy.     Pertinent Medications:    Current Outpatient Medications   Medication Sig Dispense Refill    atorvastatin (LIPITOR) 40 MG tablet Take 1 tablet (40 mg) by mouth daily. 90 tablet 2    azithromycin (ZITHROMAX) 250 MG tablet Two tablets first day, then one tablet daily for four days. 6 tablet 1    blood glucose (NO BRAND SPECIFIED) lancets standard Use to test blood sugar 1 time daily or as directed. 100 Lancet 3    blood glucose (NO BRAND SPECIFIED) test strip Use to test blood sugar once daily. 100 strip 3    Dapagliflozin Pro-metFORMIN ER (XIGDUO XR)  MG TB24 Take 1 tablet by mouth daily. 90 tablet 0    fenofibrate (TRIGLIDE/LOFIBRA) 160 MG tablet Take 1 tablet (160 mg) by mouth daily. 90 tablet 3    fluticasone (FLONASE) 50 MCG/ACT nasal spray Spray 1 spray into both nostrils daily. 16 g 1    halobetasol (ULTRAVATE) 0.05 % external cream       insulin glargine (LANTUS PEN) 100 UNIT/ML pen Inject 20 Units subcutaneously every evening 30 mL 1    insulin pen needle (31G X 5 MM) 31G X 5 MM miscellaneous Use pen needles daily with current daily insulin regimen and Mounjaro. 300 each 2    JATENZO 237 MG CAPS Take 1 capsule by mouth 2 times daily. 180 capsule 1    losartan (COZAAR) 50 MG tablet Take 1 tablet (50 mg) by mouth daily. 90 tablet 3    MOUNJARO 7.5 MG/0.5ML SOAJ auto-injector pen       NIFEdipine ER (ADALAT CC) 30 MG 24 hr tablet Take 1 tablet (30 mg) by mouth daily. 90 tablet 3    pantoprazole (PROTONIX) 40 MG EC tablet Take 1 tablet (40 mg) by mouth 2 times daily. 180 tablet 3    tadalafil (ADCIRCA/CIALIS) 20 MG tablet Take 1 tablet (20 mg) by mouth every 24 hours. 90 tablet 3    traZODone (DESYREL) 50 MG tablet Take 1-2 tablets ( mg) by mouth every evening. Take 1 hour " "before bedtime. 180 tablet 3    vitamin D3 (CHOLECALCIFEROL) 50 mcg (2000 units) tablet       zolpidem (AMBIEN) 5 MG tablet Take 1 tablet (5 mg) by mouth nightly as needed for sleep. Only as needed if Trazodone isn't effective. 30 tablet 5     No current facility-administered medications for this visit.       Pertinent Imaging Reviewed:    CT chest low dose non-contrast 4/16/25:  IMPRESSION:   Increasing size of soft tissue density in the superior right lower  lobe with extension along the fissure toward the hilum and multiple  satellite micronodules. This is highly concerning for malignancy (most  likely diagnosis would be adenocarcinoma), and tissue sampling should  be considered despite previous mediastinal lymph node biopsy negative  for malignancy.        Most Recent Labs:  Lab Results   Component Value Date    WBC 4.2 04/08/2025     Lab Results   Component Value Date    RBC 6.02 04/08/2025     Lab Results   Component Value Date    HGB 16.5 04/08/2025     Lab Results   Component Value Date    HCT 49.7 04/08/2025     Lab Results   Component Value Date     04/08/2025    Last Comprehensive Metabolic Panel:  Lab Results   Component Value Date     04/08/2025    POTASSIUM 5.0 04/08/2025    CHLORIDE 106 04/08/2025    CO2 26 04/08/2025    ANIONGAP 11 04/08/2025    GLC 96 04/08/2025    BUN 17.9 04/08/2025    CR 1.18 (H) 04/08/2025    GFRESTIMATED 72 04/08/2025    CINDY 9.8 04/08/2025      No results found for: \"INR\"       If requesting team would like sample sent for anything else please use the IR order set \"IR RAD Biopsy or Fluid Aspirate Specimens\" to select your necessary diagnostic labs and pend for admission.     Clinic visit with IR provider will be coordinated to discuss biopsy procedure, risks and benefits as needed.   Question Answer   What is the reason for the IR order request? Biopsy   What type of biopsy is needed? Lung   Laterality? Right   Patients clinical information/history? RLL nodule   Is " this biopsy procedure for research? No   Specimen orders should be placed per site protocol Acknowledge   Is there a specific location the exam needs to be scheduled at? No specific location required   Call Back # [phone]   Is the patient on aspirin, Plavix or blood thinners? No       Susana Cisneros PA-C  Interventional Radiology   [phone] (IR RN triage)

## 2025-04-22 ENCOUNTER — MYC MEDICAL ADVICE (OUTPATIENT)
Dept: INTERVENTIONAL RADIOLOGY/VASCULAR | Facility: CLINIC | Age: 55
End: 2025-04-22
Payer: COMMERCIAL

## 2025-04-22 ENCOUNTER — TELEPHONE (OUTPATIENT)
Dept: PULMONOLOGY | Facility: CLINIC | Age: 55
End: 2025-04-22
Payer: COMMERCIAL

## 2025-04-22 NOTE — TELEPHONE ENCOUNTER
"Called to inform patient of the following below on behalf of provider     \"One of your fungal antibodies came back positive and could definitely explain the CT finding and symptoms. 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 I'm going to  order a couple of additional labs (you can schedule this online through [employer] or call your local [facility] clinic to arrange a lab appt) and refer you to an infectious disease specialist. I still think we should plan on doing the biopsy just to rule out malignancy, has someone reached out to you to schedule this? I saw that the case was ap  proved with Interventional Radiology to do the biopsy through the skin ie percutaneously.\"    Patient is in agreement with plan and has no further questions or concerns at this time. Provided patient with scheduling number for infectious disease. Patient states his biopsy is scheduled for 5/5.     Lloyd Holt RN on 4/22/2025 at 1:31 PM    "

## 2025-04-23 ENCOUNTER — LAB (OUTPATIENT)
Dept: LAB | Facility: CLINIC | Age: 55
End: 2025-04-23
Payer: COMMERCIAL

## 2025-04-23 DIAGNOSIS — R91.8 PULMONARY NODULES: ICD-10-CM

## 2025-04-23 PROCEDURE — 36415 COLL VENOUS BLD VENIPUNCTURE: CPT

## 2025-04-23 PROCEDURE — 87449 NOS EACH ORGANISM AG IA: CPT | Mod: 90

## 2025-04-23 PROCEDURE — 99000 SPECIMEN HANDLING OFFICE-LAB: CPT

## 2025-04-25 ENCOUNTER — NURSE TRIAGE (OUTPATIENT)
Dept: FAMILY MEDICINE | Facility: CLINIC | Age: 55
End: 2025-04-25
Payer: COMMERCIAL

## 2025-04-25 DIAGNOSIS — R60.0 BILATERAL LOWER EXTREMITY EDEMA: Primary | ICD-10-CM

## 2025-04-28 NOTE — TELEPHONE ENCOUNTER
Routing to PCP for review per pt request. Please see [employer] message. Pt triaged and scheduled to be seen on 5/8/25.      Pt triaged for ankle swelling, right more than left.   States this began 3 months ago. He is unsure of the cause other than he was on a flight when he first noted his ankle swelling. He denies injury. Also reports bruising or blue discoloration.     He denies cool or hot skin, pain, or redness, fever, chest pain, and shortness of breath.    Pt scheduled on May 8th, 2023 with PCP. Triage disposition to be seen in 2 weeks.     Pt advised to call the clinic if swelling becomes worse, pain or discomfort occurs, skin becomes red, shortness of breath or chest pain occurs.     Pt encouraged to elevate extremities tid for at least 20 minutes. Ensure shoes and socks fit well, avoid salt and prolonged standing.     Reason for Disposition   MILD swelling of both ankles (i.e., pedal edema) is a chronic symptom (recurrent or ongoing AND present > 4 weeks)    Additional Information   Negative: Chest Pain   Negative: Followed an ankle injury   Negative: Followed a bee sting and has localized swelling (e.g., small area of puffy or swollen skin)   Negative: Followed an insect bite and has localized swelling (e.g., small area of puffy or swollen skin)   Negative: Ankle pain is main symptom   Negative: Swelling of calf or leg is main symptom   Negative: Leg swelling (e.g., ankle swelling extends up to shins or knees)   Negative: Difficulty breathing   Negative: Entire foot is cool or blue in comparison to other side   Negative: Ankle pain and fever   Negative: Ankle redness and fever   Negative: Patient sounds very sick or weak to the triager   Negative: SEVERE ankle pain (e.g., excruciating, unable to walk) and not improved after 2 hours of pain medicine   Negative: Redness and painful when touched and no fever   Negative: Red area or streak > 2 inches (or 5 cm)   Negative: Thigh or calf pain and only 1 side and  "present > 1 hour   Negative: Thigh, calf, or ankle swelling and bilateral and 1 side is more swollen   Negative: Thigh, calf, or ankle swelling and only 1 side  (Exceptions: Itchy, localized swelling; swelling is chronic.)   Negative: SEVERE ankle swelling (e.g., can't move swollen ankle at all)   Negative: Looks like a boil, infected sore, deep ulcer or other infected rash (spreading redness, pus)   Negative: Patient wants to be seen   Negative: MODERATE ankle swelling (e.g., can't move joint normally, interferes with normal activities) (Exceptions: Itchy, localized swelling; swelling is chronic.)   Negative: MILD swelling of both ankles (e.g., ankle joints look swollen; or bilateral mild pedal edema) and new-onset or getting worse  (Exceptions: Caused by hot weather, already seen by doctor or NP/PA for this.)   Negative: MILD swelling of both ankles (e.g., mild pedal edema) caused by hot weather   Negative: MILD swelling of both ankles (e.g., mild pedal edema) and has varicose veins    Answer Assessment - Initial Assessment Questions  1. LOCATION: \"Which ankle is swollen?\" \"Where is the swelling?\"      Right, on occasion left   2. ONSET: \"When did the swelling start?\"      3 months or so   3. SWELLING: \"How bad is the swelling?\" Or, \"How large is it?\" (e.g., mild, moderate, severe; size of localized swelling)       Moderate on right ankle, mild on left  4. PAIN: \"Is there any pain?\" If Yes, ask: \"How bad is it?\" (Scale 0-10; or none, mild, moderate, severe)      No   5. CAUSE: \"What do you think caused the ankle swelling?\"      Occurred after travelling on the airplane   6. OTHER SYMPTOMS: \"Do you have any other symptoms?\" (e.g., fever, chest pain, difficulty breathing, calf pain)      No, some bruising and discoloration  7. PREGNANCY: \"Is there any chance you are pregnant?\" \"When was your last menstrual period?\"      N/a    Protocols used: Ankle Swelling-A-[place]    Marianne Lewis RN on 4/28/2025 at 10:26 AM   "

## 2025-04-29 NOTE — TELEPHONE ENCOUNTER
Keep appointment on May 8, 2025.    I also recommend a venous ultrasound on both legs. Order sent and patient should call [phone] to schedule at [facility].

## 2025-04-29 NOTE — TELEPHONE ENCOUNTER
RN spoke with pt and relayed provider message. Pt verbalized understanding, pt requested scheduling number be sent via rumr.     Sheila Sawyer RN

## 2025-04-30 ENCOUNTER — VIRTUAL VISIT (OUTPATIENT)
Dept: VASCULAR SURGERY | Facility: CLINIC | Age: 55
End: 2025-04-30
Payer: COMMERCIAL

## 2025-04-30 VITALS — WEIGHT: 240 LBS | BODY MASS INDEX: 30.8 KG/M2 | HEIGHT: 74 IN

## 2025-04-30 DIAGNOSIS — R91.1 NODULE OF LOWER LOBE OF RIGHT LUNG: Primary | ICD-10-CM

## 2025-04-30 PROCEDURE — 1126F AMNT PAIN NOTED NONE PRSNT: CPT | Mod: 95 | Performed by: PHYSICIAN ASSISTANT

## 2025-04-30 PROCEDURE — 98001 SYNCH AUDIO-VIDEO NEW LOW 30: CPT | Performed by: PHYSICIAN ASSISTANT

## 2025-04-30 ASSESSMENT — PAIN SCALES - GENERAL: PAINLEVEL_OUTOF10: NO PAIN (0)

## 2025-04-30 NOTE — NURSING NOTE
Current patient location: [address] [place] [address] [address] [place] [address]    Is the patient currently in the state of [place]? YES    Visit mode: VIDEO    If the visit is dropped, the patient can be reconnected by:VIDEO VISIT: Text to cell phone:   Telephone Information:   Mobile [phone]    and VIDEO VISIT: Send to e-mail at: [email or web address]    Will anyone else be joining the visit? NO  (If patient encounters technical issues they should call [phone])    Are changes needed to the allergy or medication list? No    Patient denies any changes since echeck-in completion and states all information entered during echeck-in remains accurate.    Are refills needed on medications prescribed by this physician? NA    Rooming Documentation:  Questionnaire(s) completed    No other vitals to report today    Reason for visit: Consult    Sofía Pitt MA VVF

## 2025-04-30 NOTE — PATIENT INSTRUCTIONS
INTERVENTIONAL RADIOLOGY CLINIC AFTER VISIT SUMMARY:      Dear Sundeep Fernandez,    Thank you for choosing the [facility]. I would like to highlight some of the important information from our visit.     1) Your IR biopsy Right lung biopsy is scheduled for 5/5/2025. Take Losartan the morning of procedure.  2) Relevant medication holds include: Mounjaro for 7 days prior to procedure, NSAIDS for 5-7 days prior.    3) For sedation purposes; no solid foods or milk products for 8 hours prior to the procedure.  You may have clear liquids up to 2 hours prior to the procedure.  4) The procedure will be in interventional radiology (IR); arrive in the Gold Waiting room at your scheduled arrival time on the day of your procedure.  This is on the second floor of the hospital, located down the carpio from the main hospital lobby.  5) If you are told that you are having your procedure in the operating room (OR); arrive in unit 3C at your scheduled arrival time on the day of your procedure.  Unit 3C is Same Day Surgery; this is on the third floor of the hospital.  Follow the green diamonds on the floor.  6) If sedation is used; a responsible adult must accompany you after the procedure.  Hospital policy requires you not drive after sedation is administered.  7) Antibacterial scrub; 2 times the day before the procedure and once the day of the procedure. Clean mid chest to earlobes, both sides of the neck and chest. Place scrub on wet wash cloth, scrub on and leave for 5 minutes. Wash off with a clean wash cloth and pat dry skin, or shower.    Medicines to hold after discussing with prescribing provider:   Lovenox (enoxaparin) is generally held for 24 hours prior to invasive procedures.  If taken twice daily, hold the evening dose prior to the procedure as well as the morning dose the day of the procedure.   Coumadin (warfarin) is generally held for 5 days prior to the scheduled procedure, or when the INR  meets established IR safety laboratory parameters.    Anti-platelet inhibitors  (not including aspirin) are held for 5 days prior to the scheduled procedure.   Aspirin is held for 5 days prior to the scheduled procedure.   GLP1 agonists need to be held for 7 days prior to procedure.        Please do not hesitate to contact our department if you have any further questions or concerns.     Sincerely,      Pippa Branch PA-C    [facility] [address], [address]  IR  can be reached at [phone].   [phone] (IR Nurse Triage Line)

## 2025-04-30 NOTE — PROGRESS NOTES
INTERVENTIONAL RADIOLOGY CLINIC NOTE  Apr 30, 2025    Referring Provider:  Cony Padilla    Patient Name:  Sundeep Fernandez  MRN: [id number]  YOB: 1970  Reason for Visit:  To discuss lung biopsy scheduled 5/5/2025      ASSESSMENT and PLAN   RLL Lung Nodule:  noted on CT 12/10/2024 and underwent EBUS + BAL (01/13/2025). FNA lymph nodes showed non necrotizing granulomatous lymphadenitis and was negative for malignancy.  Cultures were negative except for staph epi, possible contaminant.  He was prescribed 14 day course of levaquin.  CT Chest Low Dose Non Contrast (04/16/2025) demonstrated increasing size of soft tissue density in superior RLL with extension along the fissure toward the hilum and multiple satellite micronodules.    -IR Consults by Susana Cisneros PA-C and Dr. Freitas (04/21/2025).  Per report, IP would also consider ION navigational bronchoscopy with biopsy to r/o malignancy.     JAIME no longer on CPAP (lost weight and no longer snores) has not used CPAP for two years now.      HTN:  On Losartan AM    DM2:  Mounjaro last Sunday and will hold this upcoming dose.      PLAN:   CT guided biopsy of Right Lower Lobe Lung Nodule.  See CT 4/16/2025 CT series 4 image 126. Send tissue diagnostics already entered by Cony Padilla PA-C    -Patient denies being on any blood thinners/anticoagulation and will hold NSAIDS for 7 days.    -Hold Mounjaro for 7 days prior to procedure  -will take Losartan in the AM before he checks in for procedure  -HCP forms will be sent to patient to fill out.      Procedure Discussion  Risks, benefits, alternatives, to biopsy procedure with sedation as well as how pathology results will be communicated have been discussed during this visit.     Risk of lung biopsy was discussed which includes but is not limited to post procedure pain, bleeding that may require blood transfusion or procedures to stop the bleeding, infection that may require treatment with  medications, injury to adjacent nerves, vessels or organ systems, air embolism, seeding of biopsy tract, non diagnostic sample that may require repeat biopsy, injury to the lung that may require chest tube placement and overnight admission with potential for specialist consultation/intervention and the possibility of death.    All questions and concerns are addressed.  Patient verbalized understanding of procedure and instructions.     Formal written consent to be obtained the day of procedure.     HISTORY OF PRESENT ILLNESS   Sundeep Fernandez is a 54 year old male with history of pituitary adenoma, dyslipidemia, HTN, DM2 on Mounjaro, sleep apnea (no longer on CPAP due to weight loss) is referred to IR by Cony Padilla PA-C from the [facility] regarding a RLL lung mass.    This mass was first noted on CXR 11/29/2024 and CT chest 12/10/2024 showed mediastinal and right hilar lymphadenopathy.  At that time this was felt to be infectious so he was treated with multiple rounds of antibiotics.  There was plan for biopsy however on the day of his procedure 1/13/2025 he had another ION CT and nodule appeared slightly smaller so an EBUS + BAL was performed but no biopsy was done on RLL nodule. FNA lymph nodes showed non necrotizing granulomatous lymphadenitis and was negative for malignancy.  Cultures were negative except for staph epi, possible contaminant.  He was prescribed 14 day course of levaquin.     CT Chest Low Dose Non Contrast (04/16/2025) demonstrated increasing size of soft tissue density in superior RLL with extension along the fissure toward the hilum and multiple satellite micronodules.    Patient reports he has been feeling pretty good since second week of January.     Pt is able to lay flat, on side or prone.      Denies any history of smoking, chest pain, SOB, cough, bleeding history, hemoptysis, history of issues with fentanyl or versed, asthma, issues with receiving blood  transfusions.    IMAGING    CT Chest Low Dose Non Contrast (04/16/2025 3:11 PM):    IMPRESSION:   Increasing size of soft tissue density in the superior right lower  lobe with extension along the fissure toward the hilum and multiple  satellite micronodules. This is highly concerning for malignancy (most  likely diagnosis would be adenocarcinoma), and tissue sampling should  be considered despite previous mediastinal lymph node biopsy negative  for malignancy.    Pippa Branch PA-C  Interventional Radiology  IR  and can be reached at [phone].   IR Nurse Triage: [phone]  =====  PAST MEDICAL HISTORY     Past Medical History:   Diagnosis Date    Diabetes (H) 1/1/2014    Hypertension 6/24/2008     PAST SURGICAL HISTORY     Past Surgical History:   Procedure Laterality Date    ABDOMEN SURGERY  9/1/99    Reflux surgery    BRONCHOSCOPY, WITH BIOPSY, ROBOT ASSISTED N/A 1/13/2025    Procedure: BRONCHOSCOPY, WITH BIOPSY OF Three LYMPH NODE STATIONS WITH ENDOBRONCHIAL ULTRASOUND GUIDANCE;  Surgeon: June Ocasio MD;  Location: [facility] OR    COLONOSCOPY  8/1/22    ENT SURGERY  9/1/2010    Septoplasty surgery    ORTHOPEDIC SURGERY  9/15/22    right rotator cuff surgery     PROBLEM LIST     Patient Active Problem List    Diagnosis Date Noted    Pituitary adenoma (H) 06/24/2019     Priority: Medium    Dyslipidemia 11/16/2017     Priority: Medium    Type 2 diabetes mellitus with obesity (H) 04/02/2012     Priority: Medium    Sleep apnea 07/10/2006     Priority: Medium     MEDICATIONS     Prescription Medications as of 4/30/2025         Rx Number Disp Refills Start End Last Dispensed Date Next Fill Date Owning Pharmacy    atorvastatin (LIPITOR) 40 MG tablet  90 tablet 2 10/1/2024 --   [id number] [place] [place] [address] [facility]    Sig: Take 1 tablet (40 mg) by mouth daily.    Class: E-Prescribe    Route: Oral    azithromycin (ZITHROMAX) 250 MG tablet  6 tablet 1 2/27/2025  --   [facility]/pharmacy #[place]  [address] [facility]    Sig: Two tablets first day, then one tablet daily for four days.    Class: E-Prescribe    blood glucose (NO BRAND SPECIFIED) lancets standard  100 Lancet 3 1/8/2025 --   [facility]/pharmacy #[place]  [address] [facility]    Sig: Use to test blood sugar 1 time daily or as directed.    Class: E-Prescribe    Notes to Pharmacy: Patient prefers OneTouch Delica Plus Lancet Extra Fine 33G if covered by insurance    blood glucose (NO BRAND SPECIFIED) test strip  100 strip 3 1/7/2025 --   [facility]/pharmacy #[place] [facility] [facility]    Sig: Use to test blood sugar once daily.    Class: E-Prescribe    Notes to Pharmacy: Dispense test strips compatible with blood glucose monitor.    Continuous Glucose Sensor (DEXCOM G7 SENSOR) MISC  3 each 5 4/21/2025 --   [facility]/pharmacy #[place] [facility] [facility]    Sig: Change every 10 days.    Class: E-Prescribe    Dapagliflozin Pro-metFORMIN ER (XIGDUO XR)  MG TB24  90 tablet 0 12/23/2024 --   [facility]/pharmacy #[place] [facility] [facility]    Sig: Take 1 tablet by mouth daily.    Class: E-Prescribe    Route: Oral    fenofibrate (TRIGLIDE/LOFIBRA) 160 MG tablet  90 tablet 3 8/21/2024 --   [facility]/pharmacy #[place] [facility]    Sig: Take 1 tablet (160 mg) by mouth daily.    Class: E-Prescribe    Route: Oral    fluticasone (FLONASE) 50 MCG/ACT nasal spray  16 g 1 10/21/2024 --   [facility]/pharmacy #[place]  [address] [facility]    Sig: [place] 1 spray into both nostrils daily.    Class: E-Prescribe    Route: Both Nostrils    halobetasol (ULTRAVATE) 0.05 % external cream  -- -- 7/1/2013 --       Class: Historical    insulin glargine (LANTUS PEN) 100 UNIT/ML pen  30 mL 1  8/5/2024 --   [facility]/pharmacy #46[place], [place] - [address] [facility]    Sig: Inject 20 Units subcutaneously every evening    Class: E-Prescribe    Notes to Pharmacy: Substitute for Levemir.    Route: Subcutaneous    insulin pen needle (31G X 5 MM) 31G X 5 MM miscellaneous  300 each 2 11/29/2024 --   [facility]/pharmacy #[facility] [place], [place] - [address] [facility]    Sig: Use pen needles daily with current daily insulin regimen and Mounjaro.    Class: E-Prescribe    Prior authorization: Closed    JATENZO 237 MG CAPS  180 capsule 1 4/16/2025 --   [facility]/pharmacy #[facility] [place], [place] - [address] [place] [address]    Sig: Take 1 capsule by mouth 2 times daily.    Class: E-Prescribe    Route: Oral    losartan (COZAAR) 50 MG tablet  90 tablet 3 12/31/2024 --   [facility]/pharmacy #[facility] [place] - [address] [facility]    Sig: Take 1 tablet (50 mg) by mouth daily.    Class: E-Prescribe    Route: Oral    MOUNJARO 7.5 MG/0.5ML SOAJ auto-injector pen  -- -- 4/6/2025 --       Class: Historical    NIFEdipine ER (ADALAT CC) 30 MG 24 hr tablet  90 tablet 3 11/20/2024 --   [facility]/pharmacy #[facility] [place], [place] - [address] [place] [address]    Sig: Take 1 tablet (30 mg) by mouth daily.    Class: E-Prescribe    Route: Oral    pantoprazole (PROTONIX) 40 MG EC tablet  180 tablet 3 1/6/2025 --   [facility]/pharmacy #[facility] [place], [place] - [address] [facility]    Sig: Take 1 tablet (40 mg) by mouth 2 times daily.    Class: E-Prescribe    Notes to Pharmacy: Correct dose.    Route: Oral    tadalafil (ADCIRCA/CIALIS) 20 MG tablet  90 tablet 3 12/25/2024 --   [facility]/pharmacy #[facility] [place], [place] - [address] [facility]    Sig: Take 1 tablet (20 mg) by mouth every 24 hours.    Class: E-Prescribe    Route: Oral    traZODone (DESYREL) 50 MG tablet  180 tablet 3 2/27/2025 --   [facility]/pharmacy #[id number] -  "[place] - [address] [address] [facility]    Sig: Take 1-2 tablets ( mg) by mouth every evening. Take 1 hour before bedtime.    Class: E-Prescribe    Notes to Pharmacy: New dose. Please file.    Route: Oral    vitamin D3 (CHOLECALCIFEROL) 50 mcg (2000 units) tablet  -- -- 7/1/2015 --       Class: Historical    zolpidem (AMBIEN) 5 MG tablet  30 tablet 5 2/27/2025 --   [facility]/pharmacy #[id number] - [place] - [address] [address] [facility]    Sig: Take 1 tablet (5 mg) by mouth nightly as needed for sleep. Only as needed if Trazodone isn't effective.    Class: E-Prescribe    Route: Oral          ALLERGIES     Allergies   Allergen Reactions    Ace Inhibitors Hives     LABS REVIEWED     No results found for: \"INR\"     Lab Results   Component Value Date    WBC 4.2 04/08/2025     Lab Results   Component Value Date    RBC 6.02 04/08/2025     Lab Results   Component Value Date    HGB 16.5 04/08/2025     Lab Results   Component Value Date    HCT 49.7 04/08/2025     Lab Results   Component Value Date    MCV 83 04/08/2025     Lab Results   Component Value Date    MCH 27.4 04/08/2025     Lab Results   Component Value Date    MCHC 33.2 04/08/2025     Lab Results   Component Value Date    RDW 16.5 04/08/2025     Lab Results   Component Value Date     04/08/2025      Vital Signs:  Ht 1.88 m (6' 2\")   Wt 108.9 kg (240 lb)   BMI 30.81 kg/m    General:  Alert and oriented times 3 answers questions appropriately  Chest:  Non labored breathing on room air.   =====    Virtual Visit Details    Type of service:  Video Visit   Originating Location (pt. Location): Home    Distant Location (provider location):  On-site  Platform used for Video Visit: Joyce  Joined the call at 4/30/2025, 8:02:54 am.  Left the call at 4/30/2025, 8:19:17 am.  You were on the call for 16 minutes 22 seconds.    CC:  1) Referring Provider  Cony Padilla PA-C  [address] [place] [address]    2) " Primary Care Provider  Mahesh Mayo MD  [address] [place] [address]

## 2025-05-05 ENCOUNTER — MYC MEDICAL ADVICE (OUTPATIENT)
Dept: FAMILY MEDICINE | Facility: CLINIC | Age: 55
End: 2025-05-05

## 2025-05-05 ENCOUNTER — APPOINTMENT (OUTPATIENT)
Dept: INTERVENTIONAL RADIOLOGY/VASCULAR | Facility: CLINIC | Age: 55
End: 2025-05-05
Attending: PHYSICIAN ASSISTANT
Payer: COMMERCIAL

## 2025-05-05 ENCOUNTER — HOSPITAL ENCOUNTER (OUTPATIENT)
Facility: CLINIC | Age: 55
Discharge: HOME | End: 2025-05-05
Attending: RADIOLOGY | Admitting: RADIOLOGY
Payer: COMMERCIAL

## 2025-05-05 ENCOUNTER — APPOINTMENT (OUTPATIENT)
Dept: MEDSURG UNIT | Facility: CLINIC | Age: 55
End: 2025-05-05
Attending: RADIOLOGY
Payer: COMMERCIAL

## 2025-05-05 ENCOUNTER — APPOINTMENT (OUTPATIENT)
Dept: GENERAL RADIOLOGY | Facility: CLINIC | Age: 55
End: 2025-05-05
Payer: COMMERCIAL

## 2025-05-05 VITALS
SYSTOLIC BLOOD PRESSURE: 125 MMHG | RESPIRATION RATE: 12 BRPM | BODY MASS INDEX: 32.73 KG/M2 | HEIGHT: 74 IN | DIASTOLIC BLOOD PRESSURE: 79 MMHG | TEMPERATURE: 98.2 F | WEIGHT: 255 LBS | OXYGEN SATURATION: 97 % | HEART RATE: 75 BPM

## 2025-05-05 DIAGNOSIS — R91.8 PULMONARY NODULES: ICD-10-CM

## 2025-05-05 LAB
GLUCOSE BLDC GLUCOMTR-MCNC: 96 MG/DL (ref 70–99)
INR PPP: 1.01 (ref 0.85–1.15)
PROTHROMBIN TIME: 13.3 SECONDS (ref 11.8–14.8)

## 2025-05-05 PROCEDURE — 87102 FUNGUS ISOLATION CULTURE: CPT | Performed by: PHYSICIAN ASSISTANT

## 2025-05-05 PROCEDURE — 999N000133 HC STATISTIC PP CARE STAGE 2

## 2025-05-05 PROCEDURE — 32408 CORE NDL BX LNG/MED PERQ: CPT

## 2025-05-05 PROCEDURE — 82962 GLUCOSE BLOOD TEST: CPT

## 2025-05-05 PROCEDURE — 999N000065 XR CHEST 1 VIEW

## 2025-05-05 PROCEDURE — 999N000142 HC STATISTIC PROCEDURE PREP ONLY

## 2025-05-05 PROCEDURE — 85610 PROTHROMBIN TIME: CPT

## 2025-05-05 PROCEDURE — 88312 SPECIAL STAINS GROUP 1: CPT | Mod: 26 | Performed by: STUDENT IN AN ORGANIZED HEALTH CARE EDUCATION/TRAINING PROGRAM

## 2025-05-05 PROCEDURE — 250N000009 HC RX 250

## 2025-05-05 PROCEDURE — 88333 PATH CONSLTJ SURG CYTO XM 1: CPT | Mod: 26 | Performed by: STUDENT IN AN ORGANIZED HEALTH CARE EDUCATION/TRAINING PROGRAM

## 2025-05-05 PROCEDURE — 272N000506 HC NEEDLE CR6

## 2025-05-05 PROCEDURE — 87075 CULTR BACTERIA EXCEPT BLOOD: CPT | Performed by: PHYSICIAN ASSISTANT

## 2025-05-05 PROCEDURE — 87116 MYCOBACTERIA CULTURE: CPT | Performed by: PHYSICIAN ASSISTANT

## 2025-05-05 PROCEDURE — 32408 CORE NDL BX LNG/MED PERQ: CPT | Mod: GC | Performed by: RADIOLOGY

## 2025-05-05 PROCEDURE — 88312 SPECIAL STAINS GROUP 1: CPT | Mod: TC | Performed by: PHYSICIAN ASSISTANT

## 2025-05-05 PROCEDURE — 87070 CULTURE OTHR SPECIMN AEROBIC: CPT | Performed by: PHYSICIAN ASSISTANT

## 2025-05-05 PROCEDURE — 36415 COLL VENOUS BLD VENIPUNCTURE: CPT

## 2025-05-05 PROCEDURE — 99152 MOD SED SAME PHYS/QHP 5/>YRS: CPT | Mod: GC | Performed by: RADIOLOGY

## 2025-05-05 PROCEDURE — 71045 X-RAY EXAM CHEST 1 VIEW: CPT | Mod: 26 | Performed by: RADIOLOGY

## 2025-05-05 PROCEDURE — 88305 TISSUE EXAM BY PATHOLOGIST: CPT | Mod: 26 | Performed by: STUDENT IN AN ORGANIZED HEALTH CARE EDUCATION/TRAINING PROGRAM

## 2025-05-05 PROCEDURE — 250N000011 HC RX IP 250 OP 636

## 2025-05-05 RX ORDER — NALOXONE HYDROCHLORIDE 0.4 MG/ML
0.4 INJECTION, SOLUTION INTRAMUSCULAR; INTRAVENOUS; SUBCUTANEOUS
Status: DISCONTINUED | OUTPATIENT
Start: 2025-05-05 | End: 2025-05-05 | Stop reason: HOSPADM

## 2025-05-05 RX ORDER — LIDOCAINE 40 MG/G
CREAM TOPICAL
Status: DISCONTINUED | OUTPATIENT
Start: 2025-05-05 | End: 2025-05-05 | Stop reason: HOSPADM

## 2025-05-05 RX ORDER — NALOXONE HYDROCHLORIDE 0.4 MG/ML
0.2 INJECTION, SOLUTION INTRAMUSCULAR; INTRAVENOUS; SUBCUTANEOUS
Status: DISCONTINUED | OUTPATIENT
Start: 2025-05-05 | End: 2025-05-05 | Stop reason: HOSPADM

## 2025-05-05 RX ORDER — FENTANYL CITRATE 50 UG/ML
25-50 INJECTION, SOLUTION INTRAMUSCULAR; INTRAVENOUS EVERY 5 MIN PRN
Status: DISCONTINUED | OUTPATIENT
Start: 2025-05-05 | End: 2025-05-05 | Stop reason: HOSPADM

## 2025-05-05 RX ORDER — FLUMAZENIL 0.1 MG/ML
0.2 INJECTION, SOLUTION INTRAVENOUS
Status: DISCONTINUED | OUTPATIENT
Start: 2025-05-05 | End: 2025-05-05 | Stop reason: HOSPADM

## 2025-05-05 RX ADMIN — FENTANYL CITRATE 25 MCG: 50 INJECTION, SOLUTION INTRAMUSCULAR; INTRAVENOUS at 12:07

## 2025-05-05 RX ADMIN — MIDAZOLAM 1 MG: 1 INJECTION INTRAMUSCULAR; INTRAVENOUS at 12:00

## 2025-05-05 RX ADMIN — MIDAZOLAM 0.5 MG: 1 INJECTION INTRAMUSCULAR; INTRAVENOUS at 12:09

## 2025-05-05 RX ADMIN — FENTANYL CITRATE 50 MCG: 50 INJECTION, SOLUTION INTRAMUSCULAR; INTRAVENOUS at 12:00

## 2025-05-05 RX ADMIN — FENTANYL CITRATE 25 MCG: 50 INJECTION, SOLUTION INTRAMUSCULAR; INTRAVENOUS at 12:09

## 2025-05-05 RX ADMIN — MIDAZOLAM 0.5 MG: 1 INJECTION INTRAMUSCULAR; INTRAVENOUS at 12:04

## 2025-05-05 RX ADMIN — LIDOCAINE HYDROCHLORIDE 3 ML: 10 INJECTION, SOLUTION EPIDURAL; INFILTRATION; INTRACAUDAL; PERINEURAL at 12:16

## 2025-05-05 ASSESSMENT — ACTIVITIES OF DAILY LIVING (ADL)
ADLS_ACUITY_SCORE: 44
ADLS_ACUITY_SCORE: 42
ADLS_ACUITY_SCORE: 44

## 2025-05-05 NOTE — PROGRESS NOTES
Pt tolerated post right lung nodule biopsy recovery well. Pt alert and oriented. VSS. Sats >92% on RA. Pt denies any SOB or pain. Right upper back biopsy site WNL. No bleeding or hematoma. Transparent dressing in place. Chest x-ray completed, read by Dr. Bonilla, no issues and pt can discharge. PIV access removed, site CDI. Tolerated oral intake and liquids. .  Ambulated without difficulty. Voided. Discharge instructions reviewed with pt. Pt verbalized understanding, copy given to patient. Pt ambulated to lobby with wife, declined wheelchair ride. Pt's wife Justina transported patient home.

## 2025-05-05 NOTE — IR NOTE
Patient Name: Sundeep Fernandez  Medical Record Number: [id number]  Today's Date: 5/5/2025    Procedure: Right Lung Nodule Biopsy and Possible Chest Tube Placement  Proceduralist: Dr. Butler and Dr. Limon   Pathology present: Yes    Procedure Start: 1159  Procedure end: 1232  Sedation medications administered: Midazolam 2 mg, Fentanyl 100 mcg     Report given to:  RN  : N/A    Other Notes: Pt arrived to IR room CT-2 from . Consent reviewed. Pt denies any questions or concerns regarding procedure. Pt positioned prone and monitored per protocol.     4 cores obtained for Surg/Path and sent to lab as ordered.   Per Surgical Pathology, No Need for Flow Cytometry.  3 cores obtained for additional cultures and sent to lab as ordered.   Patient to be on bedrest for 2 hours.    Pt tolerated procedure without any noted complications. Chest X-ray ordered 1 hour post-op. Pt transferred back to .

## 2025-05-05 NOTE — PROGRESS NOTES
Pt returned from chest x-ray. Dr. Bonilla notified and stated x-ray looks good, no issues noted. Pt can eat. Continue to monitor pt status and notify MD with any changes or concerns.

## 2025-05-05 NOTE — PRE-PROCEDURE
GENERAL PRE-PROCEDURE:   Procedure:  Image guided RIGHT lung nodule biopsy  Date/Time:  5/5/2025 11:08 AM    Written consent obtained?: Yes    Risks and benefits: Risks, benefits and alternatives were discussed    Consent given by:  Patient  Patient states understanding of procedure being performed: Yes    Patient's understanding of procedure matches consent: Yes    Procedure consent matches procedure scheduled: Yes    Expected level of sedation:  Moderate  Appropriately NPO:  Yes  ASA Class:  2  Mallampati  :  Grade 2- soft palate, base of uvula, tonsillar pillars, and portion of posterior pharyngeal wall visible  Lungs:  Lungs clear with good breath sounds bilaterally  Heart:  Normal heart sounds and rate  History & Physical reviewed:  History and physical reviewed and no updates needed  Statement of review:  I have reviewed the lab findings, diagnostic data, medications, and the plan for sedation

## 2025-05-05 NOTE — PROGRESS NOTES
Pt prepped for lung biopsy. Pt alert and oriented. VSS. Sats >92% on RA. Pt denies any pain. PIV in place. Labs in process. Consent needed. Continue to monitor pt status and notify MD with any changes or concerns.

## 2025-05-05 NOTE — PROCEDURES
[facility]    Procedure: IR Procedure Note    Date/Time: 5/5/2025 12:39 PM    Performed by: Christine Butler MD  Authorized by: Christine Butler MD  IR Fellow Physician:  Radiology Resident Physician: Yeison Limon MD    Pre Procedure Diagnosis: Right lower lung nodule  Post Procedure Diagnosis: Same    UNIVERSAL PROTOCOL   Site Marked: Yes  Prior Images Obtained and Reviewed:  Yes  Required items: Required blood products, implants, devices and special equipment available    Patient identity confirmed:  Verbally with patient, arm band and hospital-assigned identification number  Patient was reevaluated immediately before administering moderate or deep sedation or anesthesia  Confirmation Checklist:  Procedure was appropriate and matched the consent or emergent situation, relevant allergies, patient's identity using two indicators and correct equipment/implants were available  Time out: Immediately prior to the procedure a time out was called    Universal Protocol: the Joint Commission Universal Protocol was followed    Preparation: Patient was prepped and draped in usual sterile fashion    ESBL (mL):  5     ANESTHESIA    Local Anesthetic:  Lidocaine 1% without epinephrine  Anesthetic Total (mL):  5      SEDATION  Patient Sedated: Yes    Sedation Type:  Moderate (conscious) sedation  Sedation:  Fentanyl and midazolam  Vital signs: Vital signs monitored during sedation    See dictated procedure note for full details.  Findings: No immediate complications from sedative medications.    Specimens: fluid and/or tissue for gram stain and culture and core needle biopsy specimens sent for pathological analysis    Procedural Complications: None    Condition: Stable    Plan: - To 2A for routine postprocedure monitoring  - 2 hours bedrest  - Chest XR in 1 hour      PROCEDURE  Describe Procedure: Technically successful biopsy of right lower lobe upper aspect lung nodule biopsy.  Seven passes performed for surgical pathology, gram stains and cultures. No pneumothorax on post procedure scan.  Patient Tolerance:  Patient tolerated the procedure well with no immediate complications  Length of time physician/provider present for 1:1 monitoring during sedation:  23-37 min

## 2025-05-05 NOTE — DISCHARGE INSTRUCTIONS
[facility]    Interventional Radiology  Patient Instructions Following Right Lung Nodule Biopsy     AFTER YOU GO HOME  If you were given sedation, for the first 24 hours: we recommend that an adult stay with you, DO NOT drive or operate machinery at home or at work, DO NOT smoke, DO NOT make any important or legal decisions.  DO NOT drink alcoholic beverages the day of your procedure  Drink plenty of fluids   Resume your regular diet, unless otherwise instructed by your Primary Physician  Relax and take it easy for 48 hours  DO NOT do any strenuous exercise or lifting (> 10 lbs) for at least 3 days following your procedure  Keep the dressing dry and in place for 24 hours. Replace with Band aid for 2 days.  Never leave a wet dressing in place.  Do not take a shower for at least 12 hours following your procedure. No tub bath, hot tub, or swimming for 5 days.  There should be minimum drainage from the biopsy site    CALL THE PHYSICIAN IF:  You start bleeding from the procedure site.  If you do start to bleed from that site, lie down flat and hold pressure on the site for a minimum of 10 minutes.  Your physician will tell you if you need to return to the hospital  You develop nausea or vomiting  You have excessive swelling, redness, or tenderness at the site  You have drainage that looks like it is infected.  You experience severe pain  You develop hives or a rash or unexplained itching  You develop shortness of breath  You develop a temperature of 101 degrees F or greater    [facility] INTERVENTIONAL RADIOLOGY DEPARTMENT  Procedure Physician: Dr. Christine Butler  Date of procedure: May 5, 2025  Telephone Numbers: [phone]      Monday-Friday 7:30 am to 4:00 pm  [phone] After 4:00 pm Monday-Friday, Weekends & Holidays. Option #4 for the , and then ask for the Interventional Radiologist on call.  Someone is on call 24 hrs/day  [facility] toll free number: [phone] Monday-Friday 8:00 am to 4:30  pm  [facility] Emergency Dept: [phone]

## 2025-05-05 NOTE — PROGRESS NOTES
Pt returned from IR s/p right lung nodule biopsy. Pt alert and oriented. VSS. Sats >92% on RA. Pt denies any pain or SOB. Right upper back biopsy site WNL. No bleeding or hematoma. Transparent dressing in place. Chest x-ray @ 1345. Bedrest for 2 hours. Continue to monitor pt status and notify MD with any changes or concerns.

## 2025-05-06 NOTE — TELEPHONE ENCOUNTER
Please review patient message regarding dose increase. Patient scheduled 5/8/25. Thank you.    Jacoby Fernandez, RN, BSN

## 2025-05-07 LAB
ACID FAST STAIN (ARUP): NORMAL
ACID FAST STAIN (ARUP): NORMAL

## 2025-05-08 ENCOUNTER — OFFICE VISIT (OUTPATIENT)
Dept: FAMILY MEDICINE | Facility: CLINIC | Age: 55
End: 2025-05-08
Payer: COMMERCIAL

## 2025-05-08 ENCOUNTER — RESULTS FOLLOW-UP (OUTPATIENT)
Dept: PULMONOLOGY | Facility: CLINIC | Age: 55
End: 2025-05-08

## 2025-05-08 VITALS
DIASTOLIC BLOOD PRESSURE: 70 MMHG | HEART RATE: 85 BPM | WEIGHT: 256 LBS | OXYGEN SATURATION: 96 % | HEIGHT: 74 IN | RESPIRATION RATE: 16 BRPM | SYSTOLIC BLOOD PRESSURE: 124 MMHG | TEMPERATURE: 97 F | BODY MASS INDEX: 32.85 KG/M2

## 2025-05-08 DIAGNOSIS — I10 HYPERTENSION GOAL BP (BLOOD PRESSURE) < 140/90: ICD-10-CM

## 2025-05-08 DIAGNOSIS — I87.2 EDEMA OF RIGHT LOWER EXTREMITY DUE TO PERIPHERAL VENOUS INSUFFICIENCY: Primary | ICD-10-CM

## 2025-05-08 DIAGNOSIS — B07.8 COMMON WART: ICD-10-CM

## 2025-05-08 DIAGNOSIS — E11.69 TYPE 2 DIABETES MELLITUS WITH OBESITY (H): ICD-10-CM

## 2025-05-08 DIAGNOSIS — E66.9 TYPE 2 DIABETES MELLITUS WITH OBESITY (H): ICD-10-CM

## 2025-05-08 DIAGNOSIS — I87.2 STASIS DERMATITIS OF BOTH LEGS: ICD-10-CM

## 2025-05-08 LAB
BACTERIA TISS BX CULT: NORMAL
GRAM STAIN RESULT: NORMAL
GRAM STAIN RESULT: NORMAL
PATH REPORT.COMMENTS IMP SPEC: NORMAL
PATH REPORT.FINAL DX SPEC: NORMAL
PATH REPORT.GROSS SPEC: NORMAL
PATH REPORT.MICROSCOPIC SPEC OTHER STN: NORMAL
PATH REPORT.RELEVANT HX SPEC: NORMAL
PHOTO IMAGE: NORMAL

## 2025-05-08 RX ORDER — HYDROCORTISONE 10 MG/G
CREAM TOPICAL 2 TIMES DAILY PRN
COMMUNITY

## 2025-05-08 RX ORDER — LOSARTAN POTASSIUM 100 MG/1
100 TABLET ORAL DAILY
Qty: 90 TABLET | Refills: 3 | Status: SHIPPED | OUTPATIENT
Start: 2025-05-08

## 2025-05-08 NOTE — PROGRESS NOTES
[facility] Internal Medicine Progress Note           Assessment and Plan:     Edema of right lower extremity due to peripheral venous insufficiency  Most probable cause of right foot edema.    Stasis dermatitis of both legs  Consequence of peripheral venous insufficiency.    Hypertension goal BP (blood pressure) < 140/90  - losartan (COZAAR) 100 MG tablet; Take 1 tablet (100 mg) by mouth daily.    Common wart  - Adult Dermatology  Referral; Future  - imiquimod (ALDARA) 5 % external cream; Apply topically at bedtime x 12 weeks.    Type 2 diabetes mellitus with obesity (H)  - tirzepatide (MOUNJARO) 10 MG/0.5ML SOAJ auto-injector pen; Inject 0.5 mLs (10 mg) subcutaneously once a week.          Interval History:     Reason for visit:  Swelling in my right foot  Symptom onset:  More than a month  Symptoms include:  Swelling in my right foot  Symptom intensity:  Moderate  Symptom progression:  Staying the same  Had these symptoms before:  Yes  Has tried/received treatment for these symptoms:  No   He is taking medications regularly.       Significant Problems:     Patient Active Problem List   Diagnosis    Type 2 diabetes mellitus with obesity (H)    Sleep apnea    Dyslipidemia    Pituitary adenoma (H)              Review of Systems:     CONSTITUTIONAL: NEGATIVE for fever, chills, change in weight  INTEGUMENTARY/SKIN: NEGATIVE for worrisome rashes, moles or lesions  EYES: NEGATIVE for vision changes or irritation  ENT/MOUTH: NEGATIVE for ear, mouth and throat problems  RESP: NEGATIVE for significant cough or SOB  CV: NEGATIVE for chest pain, palpitations or peripheral edema  GI: NEGATIVE for nausea, abdominal pain, heartburn, or change in bowel habits  : NEGATIVE for frequency, dysuria, or hematuria  MUSCULOSKELETAL:As above.  NEURO: NEGATIVE for weakness, dizziness or paresthesias  ENDOCRINE: NEGATIVE for temperature intolerance, skin/hair changes  HEME: NEGATIVE for bleeding problems  PSYCHIATRIC:  "NEGATIVE for changes in mood or affect               Physical Exam:   /70   Pulse 85   Temp 97  F (36.1  C)   Resp 16   Ht 1.88 m (6' 2\")   Wt 116.1 kg (256 lb)   SpO2 96%   BMI 32.87 kg/m     Constitutional:   fatigued, alert, cooperative, no apparent distress, and appears stated age     Eyes:   extra-ocular muscles intact and sclera clear     ENT:   normocephalic, without obvious abnormality     Lungs:   no increased work of breathing, no retractions, and clear to auscultation     Cardiovascular:   regular rate and rhythm     Musculoskeletal:   Non-pitting edema of right lower extremity.     Neurologic:   Motor Exam:  moves all extremities well and symmetrically     Neuropsychiatric:   Affect: normal             Data:   Epic reviewed.     Disposition:  Follow-up in 3 months.    Mahesh Mayo MD  Internal Medicine  [facility] Team        "

## 2025-05-10 LAB
BACTERIA TISS BX CULT: NO GROWTH
GRAM STAIN RESULT: NORMAL
GRAM STAIN RESULT: NORMAL

## 2025-05-12 ENCOUNTER — PATIENT OUTREACH (OUTPATIENT)
Dept: CARE COORDINATION | Facility: CLINIC | Age: 55
End: 2025-05-12
Payer: COMMERCIAL

## 2025-05-12 LAB — BACTERIA TISS BX CULT: NORMAL

## 2025-05-13 ENCOUNTER — MYC MEDICAL ADVICE (OUTPATIENT)
Dept: FAMILY MEDICINE | Facility: CLINIC | Age: 55
End: 2025-05-13
Payer: COMMERCIAL

## 2025-05-13 RX ORDER — IMIQUIMOD 12.5 MG/.25G
CREAM TOPICAL
Qty: 8 PACKET | Refills: 3 | Status: SHIPPED | OUTPATIENT
Start: 2025-05-13

## 2025-05-13 NOTE — TELEPHONE ENCOUNTER
Please see mychart message and advise, discussed at last OV on 5/8/25 with PCP. Is a follow up appointment needed?     Marianne Lewis RN on 5/13/2025 at 1:28 PM

## 2025-05-15 LAB — BACTERIA TISS BX CULT: NORMAL

## 2025-05-19 LAB
PATH REPORT.ADDENDUM SPEC: NORMAL
PATH REPORT.COMMENTS IMP SPEC: NORMAL
PATH REPORT.FINAL DX SPEC: NORMAL
PATH REPORT.GROSS SPEC: NORMAL
PATH REPORT.MICROSCOPIC SPEC OTHER STN: NORMAL
PATH REPORT.RELEVANT HX SPEC: NORMAL
PHOTO IMAGE: NORMAL

## 2025-05-21 DIAGNOSIS — E11.69 TYPE 2 DIABETES MELLITUS WITH OBESITY (H): ICD-10-CM

## 2025-05-21 DIAGNOSIS — E66.9 TYPE 2 DIABETES MELLITUS WITH OBESITY (H): ICD-10-CM

## 2025-05-22 LAB — BACTERIA TISS BX CULT: NORMAL

## 2025-05-22 RX ORDER — DAPAGLIFLOZIN AND METFORMIN HYDROCHLORIDE 10; 1000 MG/1; MG/1
1 TABLET, FILM COATED, EXTENDED RELEASE ORAL DAILY
Qty: 90 TABLET | Refills: 1 | Status: SHIPPED | OUTPATIENT
Start: 2025-05-22

## 2025-05-22 NOTE — TELEPHONE ENCOUNTER
Clinic RN: Please investigate patient's chart or contact patient if the information cannot be found because patient should have run out of this medication around February 2025. Confirm patient is taking this medication as prescribed. Document findings and route refill encounter to provider for approval or denial.     Marie HARVEY RN  [facility]

## 2025-05-22 NOTE — TELEPHONE ENCOUNTER
Pt taking daily as prescribed.     Routing to provider to review and send prescription.     Sheila Sawyer RN

## 2025-05-28 ENCOUNTER — PATIENT OUTREACH (OUTPATIENT)
Dept: CARE COORDINATION | Facility: CLINIC | Age: 55
End: 2025-05-28
Payer: COMMERCIAL

## 2025-05-29 ENCOUNTER — OFFICE VISIT (OUTPATIENT)
Dept: INFECTIOUS DISEASES | Facility: CLINIC | Age: 55
End: 2025-05-29
Attending: PHYSICIAN ASSISTANT
Payer: COMMERCIAL

## 2025-05-29 VITALS
BODY MASS INDEX: 33.3 KG/M2 | OXYGEN SATURATION: 96 % | HEART RATE: 81 BPM | TEMPERATURE: 98.6 F | DIASTOLIC BLOOD PRESSURE: 77 MMHG | SYSTOLIC BLOOD PRESSURE: 131 MMHG | WEIGHT: 259.4 LBS

## 2025-05-29 DIAGNOSIS — R91.8 PULMONARY NODULES: ICD-10-CM

## 2025-05-29 LAB — BACTERIA TISS BX CULT: NORMAL

## 2025-05-29 RX ORDER — ITRACONAZOLE 100 MG/1
200 CAPSULE ORAL 2 TIMES DAILY
Qty: 184 CAPSULE | Refills: 1 | Status: SHIPPED | OUTPATIENT
Start: 2025-05-29 | End: 2025-08-29

## 2025-05-29 NOTE — PROGRESS NOTES
Infectious Disease Consultation:  Requesting MD:  Reason for consult:      HISTORY:  Pt with pond in back yard.  Outdoorsy.  Fishes.  Super sick around thanksgiving, drenching sweats and cough non productive.  Never really responded to CAP abx.  Eventually scanning done and a paratracheal node in jan 2025 showed:  nterpretation -  - Non-necrotizing granulomatous lymphadenitis  - GMS and AFB stains do not show evidence of fungal or acid-fast organisms  - Negative for epithelial malignancy    At that time a lung nodule had gotten smaller so they didn't lois the nodule.  By April scanning the nodule was much larger so lung bx done May 5    Addendum electronically signed by BETTY SALAZAR MD on 5/19/2025 at 1551 CDT   Final Diagnosis   A. LUNG, RIGHT LOWER LOBE, NEEDLE CORE BIOPSY:  - Lung parenchyma with nonnecrotizing granulomatous inflammation and giant cells in a background of chronic inflammation  - No evidence of malignancy  - See comment       He has a + blasto antibody, no antigens sent.  Fungal stain I believe is pending from the May 5th lung bx.          Pertinent past history, past infectious disease history:  DM  ED  Elevated lipids  Low T    Medications:  Reviewed prior to admission meds as applicable in chart review.  Current meds are reviewed in the EMR listed MAR.   PPI, lipitor, testosterone product, diabetic meds, cialis    ANTIBIOTICS:    Current:   Prior:   Allergy to:    SH/FH and  travel history(if applicable to consult):  Above  FH non contrib    REVIEW OF SYSTEMS:  All other systems negative    EXAMINATION:  /77 (BP Location: Right arm)   Pulse 81   Temp 98.6  F (37  C) (Oral)   Wt 117.7 kg (259 lb 6.4 oz)   SpO2 96%   BMI 33.30 kg/m    Alert, awake  Vitals tabulated above, reviewed  HEENT:hearing aids  Neck supple without lymphadenopathy  Sclera clear  CARDIOVASCULAR regular rate and rhythm, no murmur  Lungs CLEAR TO AUSCULTATION   Abdomen soft, NT/ND, absent  "HEPATOSPLENOMEGALY  Skin normal very tan  Joints normal  Neurologic exam non focal  Wound:  NA        CLINICAL DATABASE FOR---LAB/MICRO/CULTURES/IMAGING STUDIES:  Lab Results   Component Value Date    WBC 4.2 04/08/2025     Lab Results   Component Value Date    RBC 6.02 04/08/2025     Lab Results   Component Value Date    HGB 16.5 04/08/2025     Lab Results   Component Value Date    HCT 49.7 04/08/2025     No components found for: \"MCT\"  Lab Results   Component Value Date    MCV 83 04/08/2025     Lab Results   Component Value Date    MCH 27.4 04/08/2025     Lab Results   Component Value Date    MCHC 33.2 04/08/2025     Lab Results   Component Value Date    RDW 16.5 04/08/2025     Lab Results   Component Value Date     04/08/2025       Last Comprehensive Metabolic Panel:  Sodium   Date Value Ref Range Status   04/08/2025 143 135 - 145 mmol/L Final     Potassium   Date Value Ref Range Status   04/08/2025 5.0 3.4 - 5.3 mmol/L Final     Chloride   Date Value Ref Range Status   04/08/2025 106 98 - 107 mmol/L Final     Carbon Dioxide (CO2)   Date Value Ref Range Status   04/08/2025 26 22 - 29 mmol/L Final     Anion Gap   Date Value Ref Range Status   04/08/2025 11 7 - 15 mmol/L Final     GLUCOSE BY METER POCT   Date Value Ref Range Status   05/05/2025 96 70 - 99 mg/dL Final     Urea Nitrogen   Date Value Ref Range Status   04/08/2025 17.9 6.0 - 20.0 mg/dL Final     Creatinine   Date Value Ref Range Status   04/08/2025 1.18 (H) 0.67 - 1.17 mg/dL Final     GFR Estimate   Date Value Ref Range Status   04/08/2025 72 >60 mL/min/1.73m2 Final     Comment:     eGFR calculated using 2021 CKD-EPI equation.     Calcium   Date Value Ref Range Status   04/08/2025 9.8 8.8 - 10.4 mg/dL Final       Liver Function Studies -   Recent Labs   Lab Test 04/08/25  1137   PROTTOTAL 7.1   ALBUMIN 4.5   BILITOTAL 0.4   ALKPHOS 62   AST 28   ALT 6       No results found for: \"SED\"    April CT      Jan ct    Blastomyces Kathryn by EIA  Fungal " Antibodies  Collected: 04/18/25 1507   Result status: Final   Resulting lab: [facility]   Reference range: <=0.9 IV   Value: 1.7 High    Comment:   Blastomyces antibodies are elevated, Blastomyces by  Immunodiffusion will be performed. An elevated Blastomyces  EIA result in combination with a None Detected  Immunodiffusion result may indicate either early infection  or a falsely elevated EIA result. Repeat testing in 10 - 14  days may help clarify the diagnosis.  INTERPRETIVE INFORMATION: Blastomyces Antibodies EIA, SER    0.9 IV or less.......Negative  1.0-1.4 IV...........Equivocal  1.5 IV or greater....Positive   *Additional information available - comment, result note     IMPRESSION:  Non necrotizing G.N., solitary--post Bx  Paratracheal node non dx  EIA blasto test +.  Immunodiff test neg    PLAN:  Serum/urine ags  Case fits well enough to start treating.  On PPI, erection med, lipitor all will need watching closely  CT this fall  See me this fall after CT      JEANETH RED MD  [facility]  Office [phone]

## 2025-06-02 LAB — BACTERIA TISS BX CULT: NO GROWTH

## 2025-06-04 ENCOUNTER — MYC MEDICAL ADVICE (OUTPATIENT)
Dept: FAMILY MEDICINE | Facility: CLINIC | Age: 55
End: 2025-06-04
Payer: COMMERCIAL

## 2025-06-04 DIAGNOSIS — Z79.899 HIGH RISK MEDICATION USE: Primary | ICD-10-CM

## 2025-06-05 NOTE — PROGRESS NOTES
Facial Plastic and Reconstructive Surgery Consultation  Department of Otolaryngology  [facility]    Re: Sundeep Fernandez    : 1970    MRN: [id number]    Dear Dr. Flanagan,    Thank you for asking me to see your patient, Sundeep Fernandez, in consultation to evaluate his septal perforation.  Today I had the pleasure of seeing him at the Facial Plastic and Reconstructive Surgery Clinic in the Department of Otolaryngology at [facility].    IMPRESSION & RECOMMENDATIONS  1.) Nasal septal perforation. Etiology: previous septoplasty in .  Perforation monitoring:  -: septoplasty in [place] with whistling soon after surgery.   -25: Anterior septal perforation 4mm AP x 4mm S-I with 12mm intact tissue superiorly, 6mm intact tissue inferiorly and the anterior perforation edge 18mm from the columellar margin. Nasal endoscopy = No posterior cause of nasal obstruction.   Medical Decision Making (MDM): (stable chronic problem or illness) The etiology of the perforation seems to be his septoplasty in  where he had whistling right after and was then evaluated for perforation repair.  We discussed the reconstructive options of (1) continued treatment with nasal moisturizers and saline washes, (2) septal button placement, and (3) surgical repair. After carefully considering the options, the patient would like to continue with nasal hydration and take some time to consider surgery.  Care Checklist:   _Mr. Fernandez wants to go ahead with nasal hydration with saline washes and vaseline every 4 hours while awake and then consider surgery further.   _I emphasized the failure rate with multilayer surgical septal perforation repair and the possibility of repair breakdown and recurrence of his septal perforation.  _We discussed how his septal perforation is small and may not accommodate the central flange of the button. We also discused the outcomes from septal button placement varies widely where some patients are  satisfied with the button while others are dissatisfied due to discomfort, poor fit, feeling of button movement, worsened obstruction or congestion from the button, or crusting on the button. The button can shift or become infected requiring removal of the button. Even the most successful button will need to be replaced periodically.  _We discussed how it is difficult to predict how much the septal perforation contributes to the sense of nasal congestion. Repair of the perforation or placement of a button could result in no improvement in breathing or even a worsening sense of obstruction.   _RTC in 6 months to monitor for perforation size growth or sooner if you want to consider the other treatment options further.     2.) Bilateral nasal obstruction from the following anatomic causes: turbinate hypertrophy and septal perforation  Other pertinent nasal/facial anatomy: dorsum frontal view: relatively straight, radix position: normal, alar sidewall: no static or dynamic valve collapse and normal external contour, dorsal palpation: smooth on palpation, nasal bone length: average, and tip support: average  Outcomes: NOSE score: 11 on 06/12/25.  Medical Decision Making (MDM): (stable chronic problem or illness).   The nasal obstruction results from the anatomic abnormalities described above. I recommend against using nasal steroid nasal sprays because of the drying effect.  Care Checklist:  _Observe nasal function and nasal cycle. We discussed the nasal cycle in detail and he will see if part of his symptoms can be attributed to the nasal cycle.   _Administer NOSE evaluation at every visit.     3.) Allegic rhinitis.  Medical Decision Making (MDM): (stable chronic problem or illness).   Allergies likely play a role in his nasal obstruction and that surgery will not improve his allergic rhinitis.   Care Checklist:   _Allergy consultation.    4.) Obstructive Sleep Apnea (JAIME)  ~2015: last PSG showing moderate JAIME per  patient.  Medical Decision Making: Having JAIME places you at higher risk of heart or lung problems during or after any surgery. If you wear a CPAP mask, the pressure from the mask or straps can injure the surgery site. Had 3 previous sleep studies (last one in 2015) and the results are not available today. Has had multiple CPAP machines but has not used CPAP for the last 3 years. He does not notice any change in his condition having stopped CPAP.   Care Checklist:  _Sleep medicine consult since he has not had a recent sleep study and has not been wearing his CPAP.    _Do not use CPAP postoperatively during the initial phases of healing if we perform surgery in the future.  _Expected outcome after any nasal surgery: Nasal surgery can help the airflow with CPAP use; however, nasal surgery will not cure sleep apnea. CPAP may be needed after nasal surgery.   _We discussed how CPAP can dry out the perforation and could enlarge the perforation due to dryness and crusting.     5.) Comorbidities increasing the risk of surgery  Medical Decision Making: (stable chronic problem or illness).  We discussed how the patient's other health conditions increases the risk of complications during or after surgery. Recommendations to mitigate those risks are outlined in the care checklist below.    Care Checklist:  _Diabetes: Diabetes places you at higher risk of infection or wound healing problems after surgery. Work with your PCP to manage blood glucose levels. Discuss with your PCP whether any of your diabetes medications need to be stopped before surgery.   _HTN.     Background/Connection:   Preferred name = Chente  What is most important to know about you as a person? (No medical information) enjoys cooking. So = Justina.     Photographs: [facility] consents signed June 12, 2025     It has been a pleasure to participate in the care of Mr. Fernandez.    Sincerely,    Wojciech Linton MD  Facial Plastic and Reconstructive Surgeon  Department of  Otolaryngology  [facility]     HISTORY OF PRESENT ILLNESS:  As you know, Mr. Fernandez is an 54 year old-year-old male who presents with a nasal septal perforation.    -2010: septoplasty in [place] with whistling soon after surgery. Was evaluated for perforation repair soon after and told it was not possible.     The perforation is causing the symptoms documented below which are impacting his daily life.   YES. Whistling.  YES. Crusting.  No. Bleeding.  YES. Nasal congestion.    Nasal congestion is bilateral and switches throughout the day.  YES. Allergic symptoms?    YES. Previous nasal surgery? If yes, what surgery and when? See above.   No. Previous nasal trauma? If yes, what happened and when? N/A     No. Do you have any concerns about the appearance of your nose?   If any concerns are present, document their words: N/A       PAST MEDICAL HISTORY     Past Medical History:   Diagnosis Date    Diabetes (H) 1/1/2014    Hypertension 6/24/2008       PAST SURGICAL HISTORY     Past Surgical History:   Procedure Laterality Date    ABDOMEN SURGERY  9/1/99    Reflux surgery    BRONCHOSCOPY, WITH BIOPSY, ROBOT ASSISTED N/A 1/13/2025    Procedure: BRONCHOSCOPY, WITH BIOPSY OF Three LYMPH NODE STATIONS WITH ENDOBRONCHIAL ULTRASOUND GUIDANCE;  Surgeon: June Ocasio MD;  Location: [facility] OR    COLONOSCOPY  8/1/22    ENT SURGERY  9/1/2010    Septoplasty surgery    ORTHOPEDIC SURGERY  9/15/22    right rotator cuff surgery       ALLERGIES     Ace inhibitors    MEDICATIONS     Current Outpatient Medications   Medication Sig Dispense Refill    atorvastatin (LIPITOR) 40 MG tablet Take 1 tablet (40 mg) by mouth daily. 90 tablet 2    blood glucose (NO BRAND SPECIFIED) lancets standard Use to test blood sugar 1 time daily or as directed. 100 Lancet 3    blood glucose (NO BRAND SPECIFIED) test strip Use to test blood sugar once daily. 100 strip 3    Continuous Glucose Sensor (DEXCOM G7 SENSOR) MISC Change  every 10 days. 3 each 5    Dapagliflozin Pro-metFORMIN ER (XIGDUO XR)  MG TB24 TAKE 1 TABLET BY MOUTH EVERY DAY 90 tablet 1    fenofibrate (TRIGLIDE/LOFIBRA) 160 MG tablet Take 1 tablet (160 mg) by mouth daily. 90 tablet 3    halobetasol (ULTRAVATE) 0.05 % external cream       hydrocortisone 1 % CREA cream Place rectally 2 times daily as needed for itching.      imiquimod (ALDARA) 5 % external cream Apply topically at bedtime x 12 weeks. 8 packet 3    insulin glargine (LANTUS PEN) 100 UNIT/ML pen Inject 20 Units subcutaneously every evening 30 mL 1    insulin pen needle (31G X 5 MM) 31G X 5 MM miscellaneous Use pen needles daily with current daily insulin regimen and Mounjaro. 300 each 2    itraconazole (SPORANOX) 100 MG capsule Take 2 capsules (200 mg) by mouth 2 times daily for 184 doses. 184 capsule 1    JATENZO 237 MG CAPS Take 1 capsule by mouth 2 times daily. 180 capsule 1    losartan (COZAAR) 100 MG tablet Take 1 tablet (100 mg) by mouth daily. 90 tablet 3    pantoprazole (PROTONIX) 40 MG EC tablet Take 1 tablet (40 mg) by mouth 2 times daily. 180 tablet 3    tadalafil (ADCIRCA/CIALIS) 20 MG tablet Take 1 tablet (20 mg) by mouth every 24 hours. 90 tablet 3    tirzepatide (MOUNJARO) 10 MG/0.5ML SOAJ auto-injector pen Inject 0.5 mLs (10 mg) subcutaneously once a week. 2 mL 5    traZODone (DESYREL) 50 MG tablet Take 1-2 tablets ( mg) by mouth every evening. Take 1 hour before bedtime. 180 tablet 3    vitamin D3 (CHOLECALCIFEROL) 50 mcg (2000 units) tablet        No. Do you take any blood thinners?      PHYSICAL EXAM   A standard nasal exam was performed. Pertinent findings are documented in the impression and recommendations section above including the nasal septal perforation. Pertinent negative findings on exam include:  No: saddle nose deformity  YES: crusting  No: nasal polyps  No: nasal mass including growths or lesions on the perforation edge  No: bleeding  No: purulence    Standard nasal and  facial aesthetics were observed including skin quality, projection, rotation, forehead and chin position with the exceptions noted in the impression and recommendations section. Nasal and facial asymmetries were present.     Remainder of physical exam:  General: Pleasant affect, normal ability to communicate  Oral cavity/oropharynx: Normal mouth opening. No OC/OP masses.  Respiratory: NAD, no stridor, voice strong      PROCEDURE: NASAL ENDOSCOPY   Indications: examine the perforation and determine if more posterior perforations are present.  The nose was topically decongested and anesthestized with 4% lidocaine and 0.25% phenylephrine through both nostrils. The nasal endoscope was passed under endoscopic vision. Normal middle turbinate architecture. No polyps or purulence. No significant adenoid tissue. Eustachian tube orifices clear. No masses or lesions. The nasal septal perforation showed no masses or lesions on the perforation edge and no additional perforations posteriorly. No posterior cause of nasal obstruction.       SIGNATURE   MA transcription of patient information: I reviewed the information the MA transcribed from the patient that is documented below.    Signature: Wojciech Linton MD MA/RN Documentation Section     QUESTIONNAIRE     Background/Connection:   Preferred name = Chente  What is most important to know about you as a person? (No medical information) enjoys cooking    When did you first notice your septal perforation or when were you first told about it? 40 years +. [facility].    Perforation Symptoms- Does your perforation cause any of these symptoms:  YES. Whistling.  YES. Crusting.  No. Bleeding.  YES. Nasal congestion.    What caused your perforation? S/P nasoseptal surgery 2010    Causes- Do you have any history of:   No. Digital trauma to the septum (nose picking).  No. Septal hematoma (blood trapped in the septum after trauma).  No. Cocaine  use.  No. Intranasal drug use.  YES. Nasal sprays use (afrin, nasal steroids).  YES. Have you had nose surgery before?   No. Have you broken your nose before?   If yes, when? N/A     Tera's symptoms:   No. Hemoptysis.   No. Blood in urine.  No. Kidney problems.  Yes - fullness and ringing. Ear fullness.  No. Frequent ear infections  No. Sinusitis.  No. Trouble breathing through mouth,   Yes - breathing through nose. Noisy breathing.   No. Previous C-ANCA blood test.    Sarcoid:   No. Previous ACE level.   No. CXR with hilar adenopathy.    Lymphoma:  No. Fevers.  No. Chills.  No. Weight Loss.    When did you first notice your nose problem? 40 years+ ago.    Bilateral. Is the congestion worse on one side or the other?     Is the congestion constant or intermittent? constant.    Prior nasal treatment:  YES. Have you tried any nose sprays before?   If yes, what was name of the spray? Flonase, Afrin, 3-4 different Rx, unsure of names   When did you start using it? 10-12 years ago   How long did you use the spray? For over 3 months.   Did the sprays help? No benefit at all  YES with no help. Have you tried breathe-right strips?    Nasal appearance:   No. Do you have any concerns about the appearance of your nose?   If any concerns are present, document their words: N/A     Allergies:  YES. Do you have a history of allergies? Skin rash with ACE inhibitors  No. Do you have frequent sneezing?  No. Do you have frequent runny nose?   No. Do you have watery or itchy eyes?     Sinusitis:  No. Do you get sinus infections? How many infections per year? N/A   No. Do you have facial pain or pressure?   No. Difficulty with smell or reduced sense of smell?  No. Post nasal drip or runny nose down the back of your throat?  No. Tooth pain?  No. Fevers?    Sleep apnea: (STOP test)  YES. Snoring: Do you snore loudly (louder than talking or loud enough to be heard through closed doors)?  No. Tired: Do you often feel tired, fatigued, or  sleepy during daytime?  YES. Observed: Has anyone observed you stop breathing during your sleep? Hc of CPAP x 10 years, currently stopped x 3 years  YES. Blood Pressure: Do you have or are you being treated for high blood pressure? Losartan      Nasal Obstruction Symptom Evaluation (NOSE QUESTIONNAIRE):   [Administer the paper survey to the patient called the Nasal Obstruction Symptom Evaluation (NOSE QUESTIONNAIRE), and record the results below:]    1 - a very mild problem: Nasal congestion or stuffiness?  3 - a fairly bad problem: Nasal blockage or obstruction?  3 - a fairly bad problem: Trouble breathing through his nose?  2 - a moderate problem: Trouble sleeping?  2 - a moderate problem: Inability to get enough air through his nose during exercise or exertion?    Add up the NOSE Score:  11 = Total NOSE score    SURGICAL RISK FACTORS   Do you currently have or have you ever had in the past:  No. Do you currently smoke?  No. Problems with sedation, anesthesia, or surgery.  No. Use blood thinners.  YES. Diabetes.  No. Any heart problems.   No. Chest pain.  No. Do you get short of breath if you climb up 2 flights of stairs?  No. A pacemaker.  No. Any implanted device in your body.   No. Any lung problems.   No. Any kidney problems.   No. Any liver problems.   No. Hepatitis.    No. HIV or AIDS.  No. Chronic pain anywhere.  No. Problems with excessive bleeding or a bleeding disorder.  No. Problems with blood clots or a clotting disorder.   YES. Sleep apnea or sleep with a CPAP machine.  No. Any struggles with addiction?   No. Family history of excessive bleeding or a bleeding disorder?    No. Family history of blood clots or a clotting disorder?  No. Excessive scarring.   No. Isotretinoin (Accutaine) in the last year.  No. Difficulty urinating.   No. Anxiety.   No. Depression.   YES. Allergies to medicines.  No. Joint replacement.  No. Have you been told you need to take antibiotics before surgery?    Signature: Sukhdeep  Tatum Prar, CMA

## 2025-06-06 ENCOUNTER — RESULTS FOLLOW-UP (OUTPATIENT)
Dept: FAMILY MEDICINE | Facility: CLINIC | Age: 55
End: 2025-06-06

## 2025-06-06 ENCOUNTER — ANCILLARY PROCEDURE (OUTPATIENT)
Dept: ULTRASOUND IMAGING | Facility: CLINIC | Age: 55
End: 2025-06-06
Attending: INTERNAL MEDICINE
Payer: COMMERCIAL

## 2025-06-06 DIAGNOSIS — R60.0 BILATERAL LOWER EXTREMITY EDEMA: ICD-10-CM

## 2025-06-06 PROCEDURE — 93970 EXTREMITY STUDY: CPT | Performed by: RADIOLOGY

## 2025-06-09 ENCOUNTER — PATIENT OUTREACH (OUTPATIENT)
Dept: CARE COORDINATION | Facility: CLINIC | Age: 55
End: 2025-06-09
Payer: COMMERCIAL

## 2025-06-10 ENCOUNTER — PATIENT OUTREACH (OUTPATIENT)
Dept: CARE COORDINATION | Facility: CLINIC | Age: 55
End: 2025-06-10
Payer: COMMERCIAL

## 2025-06-12 ENCOUNTER — OFFICE VISIT (OUTPATIENT)
Dept: OTOLARYNGOLOGY | Facility: CLINIC | Age: 55
End: 2025-06-12
Payer: COMMERCIAL

## 2025-06-12 DIAGNOSIS — J30.2 SEASONAL ALLERGIC RHINITIS: ICD-10-CM

## 2025-06-12 DIAGNOSIS — G47.30 SLEEP APNEA: ICD-10-CM

## 2025-06-12 DIAGNOSIS — J34.89 NASAL SEPTAL PERFORATION: ICD-10-CM

## 2025-06-12 NOTE — Clinical Note
Luis, thank you for referring Mr. Fernadnez. His note is attached and he is considering whether he wants to undergo septal perforation repair. Thanks again! Alexander

## 2025-06-12 NOTE — LETTER
2025      Sundeep Fernandez  [address] [place] [address]      Dear Colleague,    Thank you for referring your patient, Sundeep Fernandez, to the [facility]. Please see a copy of my visit note below.    Facial Plastic and Reconstructive Surgery Consultation  Department of Otolaryngology  [facility]    Re: Sundeep Fernandez    : 1970    MRN: [id number]    Dear Dr. Flanagan,    Thank you for asking me to see your patient, Sundeep Fernandez, in consultation to evaluate his septal perforation.  Today I had the pleasure of seeing him at the Facial Plastic and Reconstructive Surgery Clinic in the Department of Otolaryngology at [facility].    IMPRESSION & RECOMMENDATIONS  1.) Nasal septal perforation. Etiology: previous septoplasty in .  Perforation monitoring:  -: septoplasty in [place] with whistling soon after surgery.   -25: Anterior septal perforation 4mm AP x 4mm S-I with 12mm intact tissue superiorly, 6mm intact tissue inferiorly and the anterior perforation edge 18mm from the columellar margin. Nasal endoscopy = No posterior cause of nasal obstruction.   Medical Decision Making (MDM): (stable chronic problem or illness) The etiology of the perforation seems to be his septoplasty in  where he had whistling right after and was then evaluated for perforation repair.  We discussed the reconstructive options of (1) continued treatment with nasal moisturizers and saline washes, (2) septal button placement, and (3) surgical repair. After carefully considering the options, the patient would like to continue with nasal hydration and take some time to consider surgery.  Care Checklist:   _Mr. Fernandez wants to go ahead with nasal hydration with saline washes and vaseline every 4 hours while awake and then consider surgery further.   _I emphasized the failure rate with multilayer surgical septal perforation repair and the possibility of repair breakdown and recurrence  of his septal perforation.  _We discussed how his septal perforation is small and may not accommodate the central flange of the button. We also discused the outcomes from septal button placement varies widely where some patients are satisfied with the button while others are dissatisfied due to discomfort, poor fit, feeling of button movement, worsened obstruction or congestion from the button, or crusting on the button. The button can shift or become infected requiring removal of the button. Even the most successful button will need to be replaced periodically.  _We discussed how it is difficult to predict how much the septal perforation contributes to the sense of nasal congestion. Repair of the perforation or placement of a button could result in no improvement in breathing or even a worsening sense of obstruction.   _RTC in 6 months to monitor for perforation size growth or sooner if you want to consider the other treatment options further.     2.) Bilateral nasal obstruction from the following anatomic causes: turbinate hypertrophy and septal perforation  Other pertinent nasal/facial anatomy: dorsum frontal view: relatively straight, radix position: normal, alar sidewall: no static or dynamic valve collapse and normal external contour, dorsal palpation: smooth on palpation, nasal bone length: average, and tip support: average  Outcomes: NOSE score: 11 on 06/12/25.  Medical Decision Making (MDM): (stable chronic problem or illness).   The nasal obstruction results from the anatomic abnormalities described above. I recommend against using nasal steroid nasal sprays because of the drying effect.  Care Checklist:  _Observe nasal function and nasal cycle. We discussed the nasal cycle in detail and he will see if part of his symptoms can be attributed to the nasal cycle.   _Administer NOSE evaluation at every visit.     3.) Allegic rhinitis.  Medical Decision Making (MDM): (stable chronic problem or illness).    Allergies likely play a role in his nasal obstruction and that surgery will not improve his allergic rhinitis.   Care Checklist:   _Allergy consultation.    4.) Obstructive Sleep Apnea (JAIME)  ~2015: last PSG showing moderate JAIME per patient.  Medical Decision Making: Having JAIME places you at higher risk of heart or lung problems during or after any surgery. If you wear a CPAP mask, the pressure from the mask or straps can injure the surgery site. Had 3 previous sleep studies (last one in 2015) and the results are not available today. Has had multiple CPAP machines but has not used CPAP for the last 3 years. He does not notice any change in his condition having stopped CPAP.   Care Checklist:  _Sleep medicine consult since he has not had a recent sleep study and has not been wearing his CPAP.    _Do not use CPAP postoperatively during the initial phases of healing if we perform surgery in the future.  _Expected outcome after any nasal surgery: Nasal surgery can help the airflow with CPAP use; however, nasal surgery will not cure sleep apnea. CPAP may be needed after nasal surgery.   _We discussed how CPAP can dry out the perforation and could enlarge the perforation due to dryness and crusting.     5.) Comorbidities increasing the risk of surgery  Medical Decision Making: (stable chronic problem or illness).  We discussed how the patient's other health conditions increases the risk of complications during or after surgery. Recommendations to mitigate those risks are outlined in the care checklist below.    Care Checklist:  _Diabetes: Diabetes places you at higher risk of infection or wound healing problems after surgery. Work with your PCP to manage blood glucose levels. Discuss with your PCP whether any of your diabetes medications need to be stopped before surgery.   _HTN.     Background/Connection:   Preferred name = Chente  What is most important to know about you as a person? (No medical information) enjoys  cooking. So = Justina.     Photographs: [facility] consents signed June 12, 2025     It has been a pleasure to participate in the care of Mr. Fernandez.    Sincerely,    Wojciech Linton MD  Facial Plastic and Reconstructive Surgeon  Department of Otolaryngology  [facility]            HPI     HISTORY OF PRESENT ILLNESS:  As you know, Mr. Fernandez is an 54 year old-year-old male who presents with a nasal septal perforation.    -2010: septoplasty in [place] with whistling soon after surgery. Was evaluated for perforation repair soon after and told it was not possible.     The perforation is causing the symptoms documented below which are impacting his daily life.   YES. Whistling.  YES. Crusting.  No. Bleeding.  YES. Nasal congestion.    Nasal congestion is bilateral and switches throughout the day.  YES. Allergic symptoms?    YES. Previous nasal surgery? If yes, what surgery and when? See above.   No. Previous nasal trauma? If yes, what happened and when? N/A     No. Do you have any concerns about the appearance of your nose?   If any concerns are present, document their words: N/A       PAST MEDICAL HISTORY     Past Medical History:   Diagnosis Date     Diabetes (H) 1/1/2014     Hypertension 6/24/2008       PAST SURGICAL HISTORY     Past Surgical History:   Procedure Laterality Date     ABDOMEN SURGERY  9/1/99    Reflux surgery     BRONCHOSCOPY, WITH BIOPSY, ROBOT ASSISTED N/A 1/13/2025    Procedure: BRONCHOSCOPY, WITH BIOPSY OF Three LYMPH NODE STATIONS WITH ENDOBRONCHIAL ULTRASOUND GUIDANCE;  Surgeon: June Ocasio MD;  Location: [facility] OR     COLONOSCOPY  8/1/22     ENT SURGERY  9/1/2010    Septoplasty surgery     ORTHOPEDIC SURGERY  9/15/22    right rotator cuff surgery       ALLERGIES     Ace inhibitors    MEDICATIONS     Current Outpatient Medications   Medication Sig Dispense Refill     atorvastatin (LIPITOR) 40 MG tablet Take 1 tablet (40 mg) by mouth daily. 90 tablet 2     blood glucose (NO BRAND  SPECIFIED) lancets standard Use to test blood sugar 1 time daily or as directed. 100 Lancet 3     blood glucose (NO BRAND SPECIFIED) test strip Use to test blood sugar once daily. 100 strip 3     Continuous Glucose Sensor (DEXCOM G7 SENSOR) MISC Change every 10 days. 3 each 5     Dapagliflozin Pro-metFORMIN ER (XIGDUO XR)  MG TB24 TAKE 1 TABLET BY MOUTH EVERY DAY 90 tablet 1     fenofibrate (TRIGLIDE/LOFIBRA) 160 MG tablet Take 1 tablet (160 mg) by mouth daily. 90 tablet 3     halobetasol (ULTRAVATE) 0.05 % external cream        hydrocortisone 1 % CREA cream Place rectally 2 times daily as needed for itching.       imiquimod (ALDARA) 5 % external cream Apply topically at bedtime x 12 weeks. 8 packet 3     insulin glargine (LANTUS PEN) 100 UNIT/ML pen Inject 20 Units subcutaneously every evening 30 mL 1     insulin pen needle (31G X 5 MM) 31G X 5 MM miscellaneous Use pen needles daily with current daily insulin regimen and Mounjaro. 300 each 2     itraconazole (SPORANOX) 100 MG capsule Take 2 capsules (200 mg) by mouth 2 times daily for 184 doses. 184 capsule 1     JATENZO 237 MG CAPS Take 1 capsule by mouth 2 times daily. 180 capsule 1     losartan (COZAAR) 100 MG tablet Take 1 tablet (100 mg) by mouth daily. 90 tablet 3     pantoprazole (PROTONIX) 40 MG EC tablet Take 1 tablet (40 mg) by mouth 2 times daily. 180 tablet 3     tadalafil (ADCIRCA/CIALIS) 20 MG tablet Take 1 tablet (20 mg) by mouth every 24 hours. 90 tablet 3     tirzepatide (MOUNJARO) 10 MG/0.5ML SOAJ auto-injector pen Inject 0.5 mLs (10 mg) subcutaneously once a week. 2 mL 5     traZODone (DESYREL) 50 MG tablet Take 1-2 tablets ( mg) by mouth every evening. Take 1 hour before bedtime. 180 tablet 3     vitamin D3 (CHOLECALCIFEROL) 50 mcg (2000 units) tablet        No. Do you take any blood thinners?      PHYSICAL EXAM   A standard nasal exam was performed. Pertinent findings are documented in the impression and recommendations section  above including the nasal septal perforation. Pertinent negative findings on exam include:  No: saddle nose deformity  YES: crusting  No: nasal polyps  No: nasal mass including growths or lesions on the perforation edge  No: bleeding  No: purulence    Standard nasal and facial aesthetics were observed including skin quality, projection, rotation, forehead and chin position with the exceptions noted in the impression and recommendations section. Nasal and facial asymmetries were present.     Remainder of physical exam:  General: Pleasant affect, normal ability to communicate  Oral cavity/oropharynx: Normal mouth opening. No OC/OP masses.  Respiratory: NAD, no stridor, voice strong      PROCEDURE: NASAL ENDOSCOPY   Indications: examine the perforation and determine if more posterior perforations are present.  The nose was topically decongested and anesthestized with 4% lidocaine and 0.25% phenylephrine through both nostrils. The nasal endoscope was passed under endoscopic vision. Normal middle turbinate architecture. No polyps or purulence. No significant adenoid tissue. Eustachian tube orifices clear. No masses or lesions. The nasal septal perforation showed no masses or lesions on the perforation edge and no additional perforations posteriorly. No posterior cause of nasal obstruction.       SIGNATURE   MA transcription of patient information: I reviewed the information the MA transcribed from the patient that is documented below.    Signature: Wojciech Linton MD MA/RN Documentation Section     QUESTIONNAIRE     Background/Connection:   Preferred name = Chente  What is most important to know about you as a person? (No medical information) enjoys cooking    When did you first notice your septal perforation or when were you first told about it? 40 years +. First discussed with [facility] 12/2024.    Perforation Symptoms- Does your perforation cause any of these symptoms:  YES. Whistling.  YES. Crusting.  No.  Bleeding.  YES. Nasal congestion.    What caused your perforation? S/P nasoseptal surgery 2010    Causes- Do you have any history of:   No. Digital trauma to the septum (nose picking).  No. Septal hematoma (blood trapped in the septum after trauma).  No. Cocaine use.  No. Intranasal drug use.  YES. Nasal sprays use (afrin, nasal steroids).  YES. Have you had nose surgery before?   No. Have you broken your nose before?   If yes, when? N/A     Tera's symptoms:   No. Hemoptysis.   No. Blood in urine.  No. Kidney problems.  Yes - fullness and ringing. Ear fullness.  No. Frequent ear infections  No. Sinusitis.  No. Trouble breathing through mouth,   Yes - breathing through nose. Noisy breathing.   No. Previous C-ANCA blood test.    Sarcoid:   No. Previous ACE level.   No. CXR with hilar adenopathy.    Lymphoma:  No. Fevers.  No. Chills.  No. Weight Loss.    When did you first notice your nose problem? 40 years+ ago.    Bilateral. Is the congestion worse on one side or the other?     Is the congestion constant or intermittent? constant.    Prior nasal treatment:  YES. Have you tried any nose sprays before?   If yes, what was name of the spray? Flonase, Afrin, 3-4 different Rx, unsure of names   When did you start using it? 10-12 years ago   How long did you use the spray? For over 3 months.   Did the sprays help? No benefit at all  YES with no help. Have you tried breathe-right strips?    Nasal appearance:   No. Do you have any concerns about the appearance of your nose?   If any concerns are present, document their words: N/A     Allergies:  YES. Do you have a history of allergies? Skin rash with ACE inhibitors  No. Do you have frequent sneezing?  No. Do you have frequent runny nose?   No. Do you have watery or itchy eyes?     Sinusitis:  No. Do you get sinus infections? How many infections per year? N/A   No. Do you have facial pain or pressure?   No. Difficulty with smell or reduced sense of smell?  No. Post nasal  drip or runny nose down the back of your throat?  No. Tooth pain?  No. Fevers?    Sleep apnea: (STOP test)  YES. Snoring: Do you snore loudly (louder than talking or loud enough to be heard through closed doors)?  No. Tired: Do you often feel tired, fatigued, or sleepy during daytime?  YES. Observed: Has anyone observed you stop breathing during your sleep? Hc of CPAP x 10 years, currently stopped x 3 years  YES. Blood Pressure: Do you have or are you being treated for high blood pressure? Losartan      Nasal Obstruction Symptom Evaluation (NOSE QUESTIONNAIRE):   [Administer the paper survey to the patient called the Nasal Obstruction Symptom Evaluation (NOSE QUESTIONNAIRE), and record the results below:]    1 - a very mild problem: Nasal congestion or stuffiness?  3 - a fairly bad problem: Nasal blockage or obstruction?  3 - a fairly bad problem: Trouble breathing through his nose?  2 - a moderate problem: Trouble sleeping?  2 - a moderate problem: Inability to get enough air through his nose during exercise or exertion?    Add up the NOSE Score:  11 = Total NOSE score    SURGICAL RISK FACTORS   Do you currently have or have you ever had in the past:  No. Do you currently smoke?  No. Problems with sedation, anesthesia, or surgery.  No. Use blood thinners.  YES. Diabetes.  No. Any heart problems.   No. Chest pain.  No. Do you get short of breath if you climb up 2 flights of stairs?  No. A pacemaker.  No. Any implanted device in your body.   No. Any lung problems.   No. Any kidney problems.   No. Any liver problems.   No. Hepatitis.    No. HIV or AIDS.  No. Chronic pain anywhere.  No. Problems with excessive bleeding or a bleeding disorder.  No. Problems with blood clots or a clotting disorder.   YES. Sleep apnea or sleep with a CPAP machine.  No. Any struggles with addiction?   No. Family history of excessive bleeding or a bleeding disorder?    No. Family history of blood clots or a clotting disorder?  No. Excessive  scarring.   No. Isotretinoin (Accutaine) in the last year.  No. Difficulty urinating.   No. Anxiety.   No. Depression.   YES. Allergies to medicines.  No. Joint replacement.  No. Have you been told you need to take antibiotics before surgery?    Signature: Sukhdeep Parr CMA    Again, thank you for allowing me to participate in the care of your patient.        Sincerely,        Wojciech Linton MD    Electronically signed

## 2025-06-12 NOTE — NURSING NOTE
Sundeep Fernandez's goals for this visit include:   Chief Complaint   Patient presents with    Consult     Nasal septal perforation     He requests these members of his care team be copied on today's visit information: yes    PCP: Mahesh Mayo    Referring Provider:  Lusi Flanagan MD  [address] [place] [address]    There were no vitals taken for this visit.    Do you need any medication refills at today's visit? no    Sukhdeep Parr, [facility]

## 2025-06-12 NOTE — PATIENT INSTRUCTIONS
_Nasal hydration with saline washes and vaseline every 4 hours while awake and then consider surgery further.   _I emphasized the failure rate with multilayer surgical septal perforation repair and the possibility of repair breakdown and recurrence of his septal perforation.  _We discussed how his septal perforation is small and may not accommodate the central flange of the button. We also discused the outcomes from septal button placement varies widely where some patients are satisfied with the button while others are dissatisfied due to discomfort, poor fit, feeling of button movement, worsened obstruction or congestion from the button, or crusting on the button. The button can shift or become infected requiring removal of the button. Even the most successful button will need to be replaced periodically.  _We discussed how it is difficult to predict how much the septal perforation contributes to the sense of nasal congestion. Repair of the perforation or placement of a button could result in no improvement in breathing or even a worsening sense of obstruction.   _Return to see Dr. Linton in 6 months to monitor for perforation size growth or sooner if you want to consider the other treatment options further.   _Sleep medicine consult since he has not had a recent sleep study and has not been wearing his CPAP.    _Do not use CPAP postoperatively during the initial phases of healing if we perform surgery in the future.  _Expected outcome after any nasal surgery: Nasal surgery can help the airflow with CPAP use; however, nasal surgery will not cure sleep apnea. CPAP may be needed after nasal surgery.   _We discussed how CPAP can dry out the perforation and could enlarge the perforation due to dryness and crusting.   _Diabetes: Diabetes places you at higher risk of infection or wound healing problems after surgery. Work with your PCP to manage blood glucose levels. Discuss with your PCP whether any of your diabetes  medications need to be stopped before surgery.

## 2025-06-14 ENCOUNTER — HEALTH MAINTENANCE LETTER (OUTPATIENT)
Age: 55
End: 2025-06-14

## 2025-06-16 ENCOUNTER — PATIENT OUTREACH (OUTPATIENT)
Dept: CARE COORDINATION | Facility: CLINIC | Age: 55
End: 2025-06-16
Payer: COMMERCIAL

## 2025-06-18 ENCOUNTER — MYC REFILL (OUTPATIENT)
Dept: FAMILY MEDICINE | Facility: CLINIC | Age: 55
End: 2025-06-18
Payer: COMMERCIAL

## 2025-06-18 DIAGNOSIS — E66.9 TYPE 2 DIABETES MELLITUS WITH OBESITY (H): ICD-10-CM

## 2025-06-18 DIAGNOSIS — E11.69 TYPE 2 DIABETES MELLITUS WITH OBESITY (H): ICD-10-CM

## 2025-07-01 LAB
ACID FAST STAIN (ARUP): NORMAL

## 2025-07-12 ENCOUNTER — TELEPHONE (OUTPATIENT)
Dept: FAMILY MEDICINE | Facility: CLINIC | Age: 55
End: 2025-07-12
Payer: COMMERCIAL

## 2025-07-12 DIAGNOSIS — E66.9 TYPE 2 DIABETES MELLITUS WITH OBESITY (H): Primary | ICD-10-CM

## 2025-07-12 DIAGNOSIS — E11.69 TYPE 2 DIABETES MELLITUS WITH OBESITY (H): Primary | ICD-10-CM

## 2025-07-12 NOTE — TELEPHONE ENCOUNTER
Response Requested: Alternative Requested    ONE TOUCH DELICA PLUS 33G LANC    Alternative requested: please send new RX for lancets, need to swap to [facility] but we are unable to swap on the refills

## 2025-07-14 RX ORDER — LANCETS
EACH MISCELLANEOUS
Qty: 100 EACH | Refills: 3 | Status: SHIPPED | OUTPATIENT
Start: 2025-07-14

## 2025-07-21 ENCOUNTER — TELEPHONE (OUTPATIENT)
Dept: ENDOCRINOLOGY | Facility: CLINIC | Age: 55
End: 2025-07-21
Payer: COMMERCIAL

## 2025-07-24 NOTE — TELEPHONE ENCOUNTER
Retail Pharmacy Prior Authorization Team   Phone: [phone]    PA Initiation    Medication: JATENZO 237 MG PO CAPS  Insurance Company: [employer] - Phone [phone] Fax [phone]  Pharmacy Filling the Rx: [facility]Y #[id number] - [place], [place] - [address] AT [address]  Filling Pharmacy Phone: [phone]  Filling Pharmacy Fax:    Start Date: 7/24/2025    NIGEL MOSQUERA (Wolff: [id number])

## 2025-07-24 NOTE — TELEPHONE ENCOUNTER
Pharmacy calling with an urgent PA line to call to get approval [phone]. Pharmacy is requesting provider to call to get an approval. Patient is requesting a message in [employer] in response with request.

## 2025-07-26 ENCOUNTER — HEALTH MAINTENANCE LETTER (OUTPATIENT)
Age: 55
End: 2025-07-26

## 2025-08-06 DIAGNOSIS — E78.1 HYPERTRIGLYCERIDEMIA: ICD-10-CM

## 2025-08-07 RX ORDER — FENOFIBRATE 160 MG/1
160 TABLET ORAL DAILY
Qty: 90 TABLET | Refills: 1 | Status: SHIPPED | OUTPATIENT
Start: 2025-08-07

## 2025-08-18 ENCOUNTER — MYC MEDICAL ADVICE (OUTPATIENT)
Dept: FAMILY MEDICINE | Facility: CLINIC | Age: 55
End: 2025-08-18
Payer: COMMERCIAL

## 2025-08-18 DIAGNOSIS — B07.8 COMMON WART: ICD-10-CM

## 2025-08-18 DIAGNOSIS — F51.04 CHRONIC INSOMNIA: Primary | ICD-10-CM

## 2025-08-19 RX ORDER — ZOLPIDEM TARTRATE 10 MG/1
10 TABLET ORAL
Qty: 30 TABLET | Refills: 5 | Status: SHIPPED | OUTPATIENT
Start: 2025-08-19

## 2025-08-20 RX ORDER — IMIQUIMOD 12.5 MG/.25G
CREAM TOPICAL
Qty: 8 PACKET | Refills: 3 | Status: SHIPPED | OUTPATIENT
Start: 2025-08-20

## (undated) DEVICE — ENDO VALVE SUCTION BRONCH EVIS MAJ-209

## (undated) DEVICE — SYR 30ML SLIP TIP W/O NDL 302833

## (undated) DEVICE — LINEN TOWEL PACK X5 5464

## (undated) DEVICE — SPECIMEN TRAP MUCOUS 40ML LUKI C30200A

## (undated) DEVICE — ENDO VALVE BX EVIS MAJ-210

## (undated) DEVICE — SYR 10ML SLIP TIP W/O NDL 303134

## (undated) DEVICE — LABEL MEDICATION SYSTEM 3303-P

## (undated) DEVICE — PITCHER STERILE 1000ML  SSK9004A

## (undated) DEVICE — NDL ASPIRATION EBUS-VIZISHOT 22G NA-U401SX-4022-A

## (undated) DEVICE — TUBING SUCTION 10'X3/16" N510

## (undated) DEVICE — KIT ENDO FIRST STEP DISINFECTANT 200ML W/POUCH EP-4

## (undated) DEVICE — SOL NACL 0.9% IRRIG 1000ML BOTTLE 2F7124

## (undated) DEVICE — DRSG TELFA 3X8" 1238

## (undated) RX ORDER — ONDANSETRON 2 MG/ML
INJECTION INTRAMUSCULAR; INTRAVENOUS
Status: DISPENSED
Start: 2025-01-13

## (undated) RX ORDER — LIDOCAINE HYDROCHLORIDE 10 MG/ML
INJECTION, SOLUTION EPIDURAL; INFILTRATION; INTRACAUDAL; PERINEURAL
Status: DISPENSED
Start: 2025-05-05

## (undated) RX ORDER — FENTANYL CITRATE 50 UG/ML
INJECTION, SOLUTION INTRAMUSCULAR; INTRAVENOUS
Status: DISPENSED
Start: 2025-05-05

## (undated) RX ORDER — FENTANYL CITRATE 50 UG/ML
INJECTION, SOLUTION INTRAMUSCULAR; INTRAVENOUS
Status: DISPENSED
Start: 2025-01-13

## (undated) RX ORDER — DEXAMETHASONE SODIUM PHOSPHATE 4 MG/ML
INJECTION, SOLUTION INTRA-ARTICULAR; INTRALESIONAL; INTRAMUSCULAR; INTRAVENOUS; SOFT TISSUE
Status: DISPENSED
Start: 2025-01-13